# Patient Record
Sex: FEMALE | Race: WHITE | NOT HISPANIC OR LATINO | Employment: FULL TIME | URBAN - METROPOLITAN AREA
[De-identification: names, ages, dates, MRNs, and addresses within clinical notes are randomized per-mention and may not be internally consistent; named-entity substitution may affect disease eponyms.]

---

## 2021-02-09 ENCOUNTER — TELEPHONE (OUTPATIENT)
Dept: TRANSPLANT | Facility: CLINIC | Age: 41
End: 2021-02-09

## 2021-02-09 NOTE — TELEPHONE ENCOUNTER
"[employer] BREEZE  [id number]      LIVING KIDNEY DONOR EVALUATION  Donor First Name Isabel Donor MRN    Donor Last Name Hugo Completed 2021 1:22 PM    1980 Record ID [id number]   BREEZE Screen PASSED     Intended Recipient  Recipient First Name Eulalia Recipient MRN    Recipient Last Name Chacha Relationship Close Friend   Recipient  1962 Recipient Diagnosis    Recipient's ABO      Donor Information  Age 40 Gender Female   Ht 160 cm (5' 3'') Race    Wt 70.7 kg (156 lbs) Ethnicity Not /   BMI 27.60 kg/m  Preferred Language English      Required No     Blood Type A   Demographics  Home Address [facility] [phone]   [facility] Type [facility] Alternate #    [facility] [address] Type    Country [place] Preferred Contact day Mon, Fri, Thur, Wed, Tue   Email [email or web address] Preferred Contact time 11:00 AM-1:00 PM   &&   Donor's Medical Information  Medical History Dysfunctional Uterine Bleeding   Endometriosis   other (\"endometriosis\")   other (\"keep sickness at bay\")   other (\"leg cramps\") Medications Estradiol Tablets   Multivitamin   Potassium Citrate Extended Release   Vitamin B12 Tablets   Vitamin C   Vitamin D3   Zinc   Surgical History Hysterectomy   Ovarian Cystectomy   Uterine and/or Ovarian Surgery, NOS Allergies Percocet : Rash, Nausea and/or Vomiting   Social History EtOH: Rare (1-2 drinks/month)   Illicit Drug Use: Denies   Tobacco: Denies Self-Reported Functional Status \"I am able to climb 2 flights of stairs without stopping\"   Family Medical History Cancer (Father, Mother, Aunt or Uncle)   Diabetes (Sibling)   Heart Disease (denies)   Hypertension (denies)   Kidney Disease (denies)   Kidney Stones (denies) Exercise Frequency Exercise (Not on a regular basis)   Review of Organ Systems  Review of Systems Airway or Lungs: No   Blood Disorder: No   Cancer: No   Diabetes,Thyroid,Adrenal,Endocrine Disorder: No "   Digestive or Liver: No   Female Health: Yes   Heart or Circulatory System: No   Immune Diseases: No   Kidneys and Bladder: No   Muscles,Bones,Joints: No   Neuro: No   Psych: No   &&   Donor's Social Information  Marital Status Single Living Accommodation Lives in rented accommodation   Level of Education High school or secondary school degree complete Living Arrangement Alone   Employment Status Full Time Concerns: health and life insurance No   Employer [employer] Concerns: job security and lost income No   Occupation      Medical Insurance Status Has medical insurance     High Risk Behavior  High Risk Behaviors Blood transfusion < 12 months. (NO)   Commercial sex < 12 months. (NO)   Illicit IV drug use < 5yrs. (NO)   Other high risk sexual contact < 12 months. (NO)   Reason for Donation  Referral Tx Candidate Reason for Donation I would do anything for this woman. I am a listed donor on my license as well.   Permission to Disclose Inquiry Yes Patient Comments    Donor Motivation Level Highly motivated donor     PCP Contact  PCP Name Dr. Izaguirre   PCP [facility]   PCP Phone [phone]   Emergency Contact  First Name Sailaja First Name Buzz   Last Name Gaurav Last Name Chyna   Phone # [phone] Phone # [phone]   Phone Type Mobile Phone Type Mobile   Relationship Sister Relationship Daughter   Office Use  Reviewed By    Reviewed 2/9/2021 4:12 PM   Admin Folder Archive   Comments    Lost for Followup    Extended Comments    BREEZE ID [email or web address]:[id number] survey status completed   Activity History  Call  Due Date 2/9/2021   Last Modified Date/Time 2/9/2021 12:14 PM   Comments

## 2021-02-10 DIAGNOSIS — Z00.5 TRANSPLANT DONOR EVALUATION: Primary | ICD-10-CM

## 2023-05-01 ENCOUNTER — TRANSFERRED RECORDS (OUTPATIENT)
Dept: MULTI SPECIALTY CLINIC | Facility: CLINIC | Age: 43
End: 2023-05-01

## 2024-02-01 ENCOUNTER — TRANSFERRED RECORDS (OUTPATIENT)
Dept: HEALTH INFORMATION MANAGEMENT | Facility: CLINIC | Age: 44
End: 2024-02-01
Payer: COMMERCIAL

## 2024-02-13 SDOH — HEALTH STABILITY: PHYSICAL HEALTH: ON AVERAGE, HOW MANY DAYS PER WEEK DO YOU ENGAGE IN MODERATE TO STRENUOUS EXERCISE (LIKE A BRISK WALK)?: 2 DAYS

## 2024-02-13 SDOH — HEALTH STABILITY: PHYSICAL HEALTH: ON AVERAGE, HOW MANY MINUTES DO YOU ENGAGE IN EXERCISE AT THIS LEVEL?: 10 MIN

## 2024-02-13 ASSESSMENT — LIFESTYLE VARIABLES
HOW OFTEN DO YOU HAVE A DRINK CONTAINING ALCOHOL: 2-4 TIMES A MONTH
HOW OFTEN DO YOU HAVE SIX OR MORE DRINKS ON ONE OCCASION: NEVER
HOW MANY STANDARD DRINKS CONTAINING ALCOHOL DO YOU HAVE ON A TYPICAL DAY: 1 OR 2
AUDIT-C TOTAL SCORE: 2
SKIP TO QUESTIONS 9-10: 1

## 2024-02-13 ASSESSMENT — SOCIAL DETERMINANTS OF HEALTH (SDOH)
HOW OFTEN DO YOU GET TOGETHER WITH FRIENDS OR RELATIVES?: ONCE A WEEK
ARE YOU MARRIED, WIDOWED, DIVORCED, SEPARATED, NEVER MARRIED, OR LIVING WITH A PARTNER?: LIVING WITH PARTNER
HOW OFTEN DO YOU ATTENT MEETINGS OF THE CLUB OR ORGANIZATION YOU BELONG TO?: NEVER
HOW OFTEN DO YOU ATTEND CHURCH OR RELIGIOUS SERVICES?: MORE THAN 4 TIMES PER YEAR
DO YOU BELONG TO ANY CLUBS OR ORGANIZATIONS SUCH AS CHURCH GROUPS UNIONS, FRATERNAL OR ATHLETIC GROUPS, OR SCHOOL GROUPS?: NO
HOW OFTEN DO YOU GET TOGETHER WITH FRIENDS OR RELATIVES?: ONCE A WEEK
IN A TYPICAL WEEK, HOW MANY TIMES DO YOU TALK ON THE PHONE WITH FAMILY, FRIENDS, OR NEIGHBORS?: MORE THAN THREE TIMES A WEEK

## 2024-02-14 ENCOUNTER — OFFICE VISIT (OUTPATIENT)
Dept: FAMILY MEDICINE | Facility: CLINIC | Age: 44
End: 2024-02-14
Payer: COMMERCIAL

## 2024-02-14 VITALS
HEIGHT: 63 IN | SYSTOLIC BLOOD PRESSURE: 123 MMHG | TEMPERATURE: 97.5 F | WEIGHT: 171.2 LBS | RESPIRATION RATE: 16 BRPM | BODY MASS INDEX: 30.33 KG/M2 | OXYGEN SATURATION: 95 % | DIASTOLIC BLOOD PRESSURE: 81 MMHG | HEART RATE: 79 BPM

## 2024-02-14 DIAGNOSIS — Z11.4 SCREENING FOR HIV (HUMAN IMMUNODEFICIENCY VIRUS): Primary | ICD-10-CM

## 2024-02-14 DIAGNOSIS — Z11.59 NEED FOR HEPATITIS C SCREENING TEST: ICD-10-CM

## 2024-02-14 DIAGNOSIS — Z80.1 FAMILY HX OF LUNG CANCER: ICD-10-CM

## 2024-02-14 DIAGNOSIS — Z12.4 CERVICAL CANCER SCREENING: ICD-10-CM

## 2024-02-14 DIAGNOSIS — R04.0 EPISTAXIS: ICD-10-CM

## 2024-02-14 DIAGNOSIS — K76.0 FATTY LIVER: ICD-10-CM

## 2024-02-14 DIAGNOSIS — Z90.710 S/P ABDOMINAL HYSTERECTOMY AND RIGHT SALPINGO-OOPHORECTOMY: ICD-10-CM

## 2024-02-14 DIAGNOSIS — Z12.31 ENCOUNTER FOR SCREENING MAMMOGRAM FOR BREAST CANCER: ICD-10-CM

## 2024-02-14 DIAGNOSIS — K58.9 IRRITABLE BOWEL SYNDROME, UNSPECIFIED TYPE: ICD-10-CM

## 2024-02-14 DIAGNOSIS — Z90.721 S/P ABDOMINAL HYSTERECTOMY AND RIGHT SALPINGO-OOPHORECTOMY: ICD-10-CM

## 2024-02-14 DIAGNOSIS — Z00.00 ROUTINE GENERAL MEDICAL EXAMINATION AT A HEALTH CARE FACILITY: ICD-10-CM

## 2024-02-14 DIAGNOSIS — Z90.79 S/P ABDOMINAL HYSTERECTOMY AND RIGHT SALPINGO-OOPHORECTOMY: ICD-10-CM

## 2024-02-14 DIAGNOSIS — Z00.00 VISIT FOR PREVENTIVE HEALTH EXAMINATION: ICD-10-CM

## 2024-02-14 PROBLEM — I77.4 CELIAC ARTERY COMPRESSION SYNDROME (H): Status: ACTIVE | Noted: 2024-02-14

## 2024-02-14 PROCEDURE — G0145 SCR C/V CYTO,THINLAYER,RESCR: HCPCS | Performed by: PHYSICIAN ASSISTANT

## 2024-02-14 PROCEDURE — 99386 PREV VISIT NEW AGE 40-64: CPT | Performed by: PHYSICIAN ASSISTANT

## 2024-02-14 PROCEDURE — 87624 HPV HI-RISK TYP POOLED RSLT: CPT | Performed by: PHYSICIAN ASSISTANT

## 2024-02-14 PROCEDURE — 99214 OFFICE O/P EST MOD 30 MIN: CPT | Mod: 25 | Performed by: PHYSICIAN ASSISTANT

## 2024-02-14 RX ORDER — ESTRADIOL 1 MG/1
1 TABLET ORAL DAILY
COMMUNITY
End: 2024-02-14

## 2024-02-14 RX ORDER — LEVOCETIRIZINE DIHYDROCHLORIDE 5 MG/1
5 TABLET, FILM COATED ORAL EVERY EVENING
COMMUNITY

## 2024-02-14 RX ORDER — ESTRADIOL 1 MG/1
1 TABLET ORAL DAILY
Qty: 90 TABLET | Refills: 3 | Status: SHIPPED | OUTPATIENT
Start: 2024-02-14 | End: 2024-02-28

## 2024-02-14 ASSESSMENT — PAIN SCALES - GENERAL: PAINLEVEL: NO PAIN (0)

## 2024-02-14 NOTE — PATIENT INSTRUCTIONS
[facility] in [place], [place]  -Dr. Mehrdad Lopez  - schedule through text: [phone]     Preventive Care Advice   This is general advice given by our system to help you stay healthy. However, your care team may have specific advice just for you. Please talk to your care team about your preventive care needs.  Nutrition  Eat 5 or more servings of fruits and vegetables each day.  Try wheat bread, brown rice and whole grain pasta (instead of white bread, rice, and pasta).  Get enough calcium and vitamin D. Check the label on foods and aim for 100% of the RDA (recommended daily allowance).  Lifestyle  Exercise at least 150 minutes each week  (30 minutes a day, 5 days a week).  Do muscle strengthening activities 2 days a week. These help control your weight and prevent disease.  No smoking.  Wear sunscreen to prevent skin cancer.  Have a dental exam and cleaning every 6 months.  Yearly exams  See your health care team every year to talk about:  Any changes in your health.  Any medicines your care team has prescribed.  Preventive care, family planning, and ways to prevent chronic diseases.  Shots (vaccines)   HPV shots (up to age 26), if you've never had them before.  Hepatitis B shots (up to age 59), if you've never had them before.  COVID-19 shot: Get this shot when it's due.  Flu shot: Get a flu shot every year.  Tetanus shot: Get a tetanus shot every 10 years.  Pneumococcal, hepatitis A, and RSV shots: Ask your care team if you need these based on your risk.  Shingles shot (for age 50 and up)  General health tests  Diabetes screening:  Starting at age 35, Get screened for diabetes at least every 3 years.  If you are younger than age 35, ask your care team if you should be screened for diabetes.  Cholesterol test: At age 39, start having a cholesterol test every 5 years, or more often if advised.  Bone density scan (DEXA): At age 50, ask your care team if you should have this scan for osteoporosis  (brittle bones).  Hepatitis C: Get tested at least once in your life.  STIs (sexually transmitted infections)  Before age 24: Ask your care team if you should be screened for STIs.  After age 24: Get screened for STIs if you're at risk. You are at risk for STIs (including HIV) if:  You are sexually active with more than one person.  You don't use condoms every time.  You or a partner was diagnosed with a sexually transmitted infection.  If you are at risk for HIV, ask about PrEP medicine to prevent HIV.  Get tested for HIV at least once in your life, whether you are at risk for HIV or not.  Cancer screening tests  Cervical cancer screening: If you have a cervix, begin getting regular cervical cancer screening tests starting at age 21.  Breast cancer scan (mammogram): If you've ever had breasts, begin having regular mammograms starting at age 40. This is a scan to check for breast cancer.  Colon cancer screening: It is important to start screening for colon cancer at age 45.  Have a colonoscopy test every 10 years (or more often if you're at risk) Or, ask your provider about stool tests like a FIT test every year or Cologuard test every 3 years.  To learn more about your testing options, visit:   https://www.[employer]/033000.pdf.  For help making a decision, visit:   https://bit.ly/rz30662.  Prostate cancer screening test: If you have a prostate, ask your care team if a prostate cancer screening test (PSA) at age 55 is right for you.  Lung cancer screening: If you are a current or former smoker ages 50 to 80, ask your care team if ongoing lung cancer screenings are right for you.  For informational purposes only. Not to replace the advice of your health care provider. Copyright   2023 [place] [employer]. All rights reserved. Clinically reviewed by the [facility] Transitions Program. [employer] 239627 - REV 01/24.    Learning About Stress  What is stress?     Stress is your body's response to a hard  situation. Your body can have a physical, emotional, or mental response. Stress is a fact of life for most people, and it affects everyone differently. What causes stress for you may not be stressful for someone else.  A lot of things can cause stress. You may feel stress when you go on a job interview, take a test, or run a race. This kind of short-term stress is normal and even useful. It can help you if you need to work hard or react quickly. For example, stress can help you finish an important job on time.  Long-term stress is caused by ongoing stressful situations or events. Examples of long-term stress include long-term health problems, ongoing problems at work, or conflicts in your family. Long-term stress can harm your health.  How does stress affect your health?  When you are stressed, your body responds as though you are in danger. It makes hormones that speed up your heart, make you breathe faster, and give you a burst of energy. This is called the fight-or-flight stress response. If the stress is over quickly, your body goes back to normal and no harm is done.  But if stress happens too often or lasts too long, it can have bad effects. Long-term stress can make you more likely to get sick, and it can make symptoms of some diseases worse. If you tense up when you are stressed, you may develop neck, shoulder, or low back pain. Stress is linked to high blood pressure and heart disease.  Stress also harms your emotional health. It can make you hammond, tense, or depressed. Your relationships may suffer, and you may not do well at work or school.  What can you do to manage stress?  You can try these things to help manage stress:   Do something active. Exercise or activity can help reduce stress. Walking is a great way to get started. Even everyday activities such as housecleaning or yard work can help.  Try yoga or ashlee chi. These techniques combine exercise and meditation. You may need some training at first to  learn them.  Do something you enjoy. For example, listen to music or go to a movie. Practice your hobby or do volunteer work.  Meditate. This can help you relax, because you are not worrying about what happened before or what may happen in the future.  Do guided imagery. Imagine yourself in any setting that helps you feel calm. You can use online videos, books, or a teacher to guide you.  Do breathing exercises. For example:  From a standing position, bend forward from the waist with your knees slightly bent. Let your arms dangle close to the floor.  Breathe in slowly and deeply as you return to a standing position. Roll up slowly and lift your head last.  Hold your breath for just a few seconds in the standing position.  Breathe out slowly and bend forward from the waist.  Let your feelings out. Talk, laugh, cry, and express anger when you need to. Talking with supportive friends or family, a counselor, or a ade leader about your feelings is a healthy way to relieve stress. Avoid discussing your feelings with people who make you feel worse.  Write. It may help to write about things that are bothering you. This helps you find out how much stress you feel and what is causing it. When you know this, you can find better ways to cope.  What can you do to prevent stress?  You might try some of these things to help prevent stress:  Manage your time. This helps you find time to do the things you want and need to do.  Get enough sleep. Your body recovers from the stresses of the day while you are sleeping.  Get support. Your family, friends, and community can make a difference in how you experience stress.  Limit your news feed. Avoid or limit time on social media or news that may make you feel stressed.  Do something active. Exercise or activity can help reduce stress. Walking is a great way to get started.  Where can you learn more?  Go to https://www.healthwise.net/patiented  Enter N032 in the search box to learn more  "about \"Learning About Stress.\"  Current as of: February 26, 2023               Content Version: 13.8    3533-5821 [employer].   Care instructions adapted under license by your healthcare professional. If you have questions about a medical condition or this instruction, always ask your healthcare professional. [employer] disclaims any warranty or liability for your use of this information.      "

## 2024-02-14 NOTE — PROGRESS NOTES
Preventive Care Visit  [facility]  Andry Chamberlain PA-C, Family Medicine  Feb 14, 2024    Assessment & Plan     Screening for HIV (human immunodeficiency virus)  Low risk. Denied.     Need for hepatitis C screening test    - Hepatitis C Screen Reflex to HCV RNA Quant and Genotype; Future    Cervical cancer screening    - Pap Screen with HPV - recommended age 30 - 65 years    Fatty liver  Self reported. Monitoring lfts and cbc. Fib-4 to be calculated.   - CBC with platelets and differential; Future  - Comprehensive metabolic panel (BMP + Alb, Alk Phos, ALT, AST, Total. Bili, TP); Future    S/P abdominal hysterectomy and right salpingo-oophorectomy  On chronic estrogen replacement. No risk factors known outside of estrace use for clots. Given age under 60, complications of this is  rare.  I do recommend annual mammograms however given increased breast cancer risk. May continue this until expected into menopause. This is to be  expected ~55 years. Will taper dose down in future.   - estradiol (ESTRACE) 1 MG tablet; Take 1 tablet (1 mg) by mouth daily  Medication use and side effects discussed with the patient. Patient is in complete understanding and agreement with plan.     Encounter for screening mammogram for breast cancer  As above   - MA Screen Bilateral w/Andrea; Future    Visit for preventive health examination  Other than above and below, stable wellness. Not fasting. Fasting labs  pending and  also will  - Glucose; Future  - Lipid panel reflex to direct LDL Fasting; Future    Routine general medical examination at a health care facility  As above     Epistaxis  Reported. Single nare. No other symptoms  or exam findings to suggest bleeding disorder. Likely hypervascular etiology. Will have see primary care ent clinic and discuss possible cautery. Contact information given to patient.     Family hx of lung cancer  Strong family history in quite young ages. Discussed possible genetic  "predisposition. Patient would be interested in considering genetic testing. Referral given.   - Adult Genetics & Metabolism Referral; Future      Also question diagnosis  of possible CAD given age and lack of knowledge on this diagnosis from patient. Will await COLBY and records to review. If truly present, would recommend  ASA and statin.       BMI  Estimated body mass index is 30.33 kg/m  as calculated from the following:    Height as of this encounter: 1.6 m (5' 3\").    Weight as of this encounter: 77.7 kg (171 lb 3.2 oz).   Weight management plan: Discussed healthy diet and exercise guidelines    Counseling  Appropriate preventive services were discussed with this patient, including applicable screening as appropriate for fall prevention, nutrition, physical activity, Tobacco-use cessation, weight loss and cognition.  Checklist reviewing preventive services available has been given to the patient.  Reviewed patient's diet, addressing concerns and/or questions.   She is at risk for lack of exercise and has been provided with information to increase physical activity for the benefit of her well-being.   She is at risk for psychosocial distress and has been provided with information to reduce risk.             Eduardo Hall is a 43 year old, presenting for the following:  Physical        2/14/2024     3:05 PM   Additional Questions   Roomed by Kimber GERMAN CMA        Via the Health Maintenance questionnaire, the patient has reported the following services have been completed -Mammogram-Colonscopy, this information has been sent to the abstraction team.  Health Care Directive  Patient does not have a Health Care Directive or Living Will: Discussed advance care planning with patient; information given to patient to review.    HPI    Has been having bloody nose for about a year. Wakes up every morning with a blocked right nostril (with scabbed blood).   Right nostril bleeding  Isabel has been having bloody nose at " No the right side for about a year. Wakes up every morning with a blocked right nostril with scabbed blood. She said taking hot shower helps with cleaning up the congestion. She takes Xyzal daily for allergy but that did not help with the congested nose on the right side. Denies fatigue, denies bruise or petechia; Denies trauma to her nose.      Pap screening   History of total hysterectomy supracervical with right oophorectomy d/t endometriosis,with bilateral salpingectomy, right sided oophorectomy. she has been doing pap smear every three year with her previous provider. Denies urinary symptoms, odor or abnormal discharge. Will do a pap smear today. Has taken estrace 1 mg daily for years. No side effects. No personal history of smoking or known personal or family history of clotting disorders.      Family history of Lung Cancer  Isabel is a non-smoker without symptoms of shortness of breath, or weight loss. Referral for genetic testing due to early onset of lung cancer in parents and siblings.     Mammogram  Isabel reports her last mammogram was two years ago, no abnormal findings.       Past history of fatty liver per patient.     Also states was told by her previous GI doctor she has atherosclerotic coronary artery disease. She denies ever seeing a cardiologist. No chest pains or shortness of breath. No history of angiograms or past known evaluations of this.         2/13/2024   General Health   How would you rate your overall physical health? Good   Feel stress (tense, anxious, or unable to sleep) Only a little    Only a little   (!) STRESS CONCERN      2/13/2024   Nutrition   Three or more servings of calcium each day? Yes   Diet: Other   If other, please elaborate: No eggs or chicken.  Allergy   How many servings of fruit and vegetables per day? (!) 2-3   How many sweetened beverages each day? 0-1         2/13/2024   Exercise   Days per week of moderate/strenous exercise 2 days    2 days   Average minutes spent  exercising at this level 10 min    10 min   (!) EXERCISE CONCERN      2/13/2024   Social Factors   Frequency of gathering with friends or relatives Once a week    Once a week   Worry food won't last until get money to buy more No    No   Food not last or not have enough money for food? No    No   Do you have housing?  Yes    Yes   Are you worried about losing your housing? No    No   Lack of transportation? No    No   Unable to get utilities (heat,electricity)? No    No         2/13/2024   Dental   Dentist two times every year? Yes         2/13/2024   TB Screening   Were you born outside of [place]?  No     Today's PHQ-2 Score:       2/13/2024     3:53 PM   PHQ-2 ( 1999 [employer])   Q1: Little interest or pleasure in doing things 0   Q2: Feeling down, depressed or hopeless 0   PHQ-2 Score 0   Q1: Little interest or pleasure in doing things Not at all   Q2: Feeling down, depressed or hopeless Not at all   PHQ-2 Score 0         2/13/2024   Substance Use   Frequency of drinking alcohol? 2-4 times a month   Alcohol more than 3/day or more than 7/wk No   Do you use any other substances recreationally? No     Social History     Tobacco Use    Smoking status: Never    Smokeless tobacco: Never   Vaping Use    Vaping Use: Never used   Substance Use Topics    Alcohol use: Yes     Comment: Occasionally    Drug use: Never         2/13/2024   Breast Cancer Screening   Family history of breast, colon, or ovarian cancer? No / Unknown      Mammogram Screening - Annual screen due to greater than 20% lifetime risk as estimated by Breast Cancer Risk Calculator        2/13/2024   One time HIV Screening   Previous HIV test? No         2/13/2024   STI Screening   New sexual partner(s) since last STI/HIV test? No     History of abnormal Pap smear: NO - age 30-65 PAP every 5 years with negative HPV co-testing recommended       The ASCVD Risk score (Wilton GONZALEZ, et al., 2019) failed to calculate for the following reasons:    Cannot find a previous  "HDL lab    Cannot find a previous total cholesterol lab       Reviewed and updated as needed this visit by Provider                  No past medical history on file.  Past Surgical History:   Procedure Laterality Date    COLONOSCOPY  9/2021    HYSTERECTOMY, SUPRACERVICAL, ABDOMINAL, WITH SALPINGO-OOPHORECTOMY, CYSTOSCOPY  April 1999     BP Readings from Last 3 Encounters:   02/14/24 123/81    Wt Readings from Last 3 Encounters:   02/14/24 77.7 kg (171 lb 3.2 oz)                  Patient Active Problem List   Diagnosis    Celiac artery compression syndrome (H24)     Past Surgical History:   Procedure Laterality Date    COLONOSCOPY  9/2021    HYSTERECTOMY, SUPRACERVICAL, ABDOMINAL, WITH SALPINGO-OOPHORECTOMY, CYSTOSCOPY  April 1999       Social History     Tobacco Use    Smoking status: Never    Smokeless tobacco: Never   Substance Use Topics    Alcohol use: Yes     Comment: Occasionally     Family History   Problem Relation Age of Onset    Lung Cancer Mother         Non small cell carcinoma    Lung Cancer Father         Agent Orange    Lung Cancer Sister         Lung- adenocacinoma    Lung Cancer Brother         Non small cell carcinoma         Current Outpatient Medications   Medication Sig Dispense Refill    estradiol (ESTRACE) 1 MG tablet Take 1 tablet (1 mg) by mouth daily 90 tablet 3    levocetirizine (XYZAL ALLERGY 24HR) 5 MG tablet Take 5 mg by mouth every evening      Saccharomyces boulardii 250 MG PACK        Allergies   Allergen Reactions    Oxycodone Hives, Itching and Nausea     No lab results found.          Objective    Exam  /81 (BP Location: Right arm, Patient Position: Sitting, Cuff Size: Adult Regular)   Pulse 79   Temp 97.5  F (36.4  C) (Temporal)   Resp 16   Ht 1.6 m (5' 3\")   Wt 77.7 kg (171 lb 3.2 oz)   LMP  (LMP Unknown)   SpO2 95%   BMI 30.33 kg/m     Estimated body mass index is 30.33 kg/m  as calculated from the following:    Height as of this encounter: 1.6 m (5' 3\").    " Weight as of this encounter: 77.7 kg (171 lb 3.2 oz).    Physical Exam  GENERAL: alert and no distress  EYES: Eyes grossly normal to inspection, PERRL and conjunctivae and sclerae normal  HENT: ear canals and TM's normal, nose and mouth without ulcers or lesions  NECK: no adenopathy, no asymmetry, masses, or scars  RESP: lungs clear to auscultation - no rales, rhonchi or wheezes  BREAST: normal without masses, tenderness or nipple discharge and no palpable axillary masses or adenopathy  CV: regular rate and rhythm, normal S1 S2, no S3 or S4, no murmur, click or rub, no peripheral edema  ABDOMEN: soft, nontender, no hepatosplenomegaly, no masses and bowel sounds normal   (female) w/bimanual: normal female external genitalia, normal urethral meatus, normal vaginal mucosa, and normal cervix. Normal s/p hysterectomy exam noted.   MS: no gross musculoskeletal defects noted, no edema  SKIN: no suspicious lesions or rashes  NEURO: Normal strength and tone, mentation intact and speech normal  PSYCH: mentation appears normal, affect normal/bright    Signed Electronically by: Andry Chamberlain PA-C

## 2024-02-14 NOTE — PROGRESS NOTES
Preventive Care Visit  [facility]  Andry Chamberlain PA-C, Family Medicine  Feb 14, 2024  {Provider  Link to SmartSet :741403}  {PROVIDER CHARTING PREFERENCE:061157}    Subjective   Isabel is a 43 year old, presenting for the following:  Physical        2/14/2024     3:05 PM   Additional Questions   Roomed by Kimber GERMAN CMA        Via the [employer] Maintenance questionnaire, the patient has reported the following services have been completed -Mammogram-Colonscopy, this information has been sent to the abstraction team.  Health Care Directive  Patient does not have a Health Care Directive or Living Will: {ADVANCE_DIRECTIVE_STATUS:056156}    HPI    Right nostril bleeding  Isabel has been having bloody nose at the right side for about a year. Wakes up every morning with a blocked right nostril with scabbed blood. She said taking hot shower helps with cleaning up the congestion. She takes Xyzal daily for allergy but that did not help with the congested nose on the right side. Denies fatigue, denies bruise or petechia; Denies trauma to her nose.     Pap screening   History of total hysterectomy d/t endometriosis,with bilateral salpingectomy, right sided oophorectomy. she has been doing pap smear every three year with her previous provider. Denies urinary symptoms, odor or abnormal discharge. Will do a pap smear today     Family history of Lung Cancer  Isabel is a non-smoker without symptoms of shortness of breath, or weight loss. Referral for genetic testing due to early onset of lung cancer in parents and siblings.    Mammogram  Isabel reports her last mammogram was two years ago, no abnormal findings.   - Breast exam was significant for a fibrositic change on left breast on outer upper quadrant. It is painless and mobile, size about 0.5 cm in diameter.   - Patient is encouraged to do a breast mammogram     Future labs for annual screening   - CBC   - CMP  - Fasting Lipid     {SUPERLIST  (Optional):333538}  {additonal problems for provider to add (Optional):261215}      2/13/2024   General Health   How would you rate your overall physical health? Good   Feel stress (tense, anxious, or unable to sleep) Only a little    Only a little   (!) STRESS CONCERN      2/13/2024   Nutrition   Three or more servings of calcium each day? Yes   Diet: Other   If other, please elaborate: No eggs or chicken.  Allergy   How many servings of fruit and vegetables per day? (!) 2-3   How many sweetened beverages each day? 0-1         2/13/2024   Exercise   Days per week of moderate/strenous exercise 2 days    2 days   Average minutes spent exercising at this level 10 min    10 min   (!) EXERCISE CONCERN      2/13/2024   Social Factors   Frequency of gathering with friends or relatives Once a week    Once a week   Worry food won't last until get money to buy more No    No   Food not last or not have enough money for food? No    No   Do you have housing?  Yes    Yes   Are you worried about losing your housing? No    No   Lack of transportation? No    No   Unable to get utilities (heat,electricity)? No    No         2/13/2024   Dental   Dentist two times every year? Yes         2/13/2024   TB Screening   Were you born outside of [place]?  No     Today's PHQ-2 Score:       2/13/2024     3:53 PM   PHQ-2 ( 1999 [employer])   Q1: Little interest or pleasure in doing things 0   Q2: Feeling down, depressed or hopeless 0   PHQ-2 Score 0   Q1: Little interest or pleasure in doing things Not at all   Q2: Feeling down, depressed or hopeless Not at all   PHQ-2 Score 0         2/13/2024   Substance Use   Frequency of drinking alcohol? 2-4 times a month   Alcohol more than 3/day or more than 7/wk No   Do you use any other substances recreationally? No     Social History     Tobacco Use    Smoking status: Never    Smokeless tobacco: Never   Vaping Use    Vaping Use: Never used   Substance Use Topics    Alcohol use: Yes     Comment: Occasionally     "Drug use: Never         2/13/2024   Breast Cancer Screening   Family history of breast, colon, or ovarian cancer? No / Unknown      {Mammogram Decision Support (Optional):079877}        2/13/2024   One time HIV Screening   Previous HIV test? No         2/13/2024   STI Screening   New sexual partner(s) since last STI/HIV test? No     History of abnormal Pap smear: NO - age 30-65 PAP every 5 years with negative HPV co-testing recommended       The ASCVD Risk score (Wilton GONZALEZ, et al., 2019) failed to calculate for the following reasons:    Cannot find a previous HDL lab    Cannot find a previous total cholesterol lab    {Use the storyboard to review patient history, after sections have been marked as reviewed, refresh note to capture documentation:778201}   Reviewed and updated as needed this visit by Provider                  {HISTORY OPTIONS (Optional):225371}    {ROS Picklists (Optional):227101}     Objective    Exam  /81 (BP Location: Right arm, Patient Position: Sitting, Cuff Size: Adult Regular)   Pulse 79   Temp 97.5  F (36.4  C) (Temporal)   Resp 16   Ht 1.6 m (5' 3\")   Wt 77.7 kg (171 lb 3.2 oz)   LMP  (LMP Unknown)   SpO2 95%   BMI 30.33 kg/m     Estimated body mass index is 30.33 kg/m  as calculated from the following:    Height as of this encounter: 1.6 m (5' 3\").    Weight as of this encounter: 77.7 kg (171 lb 3.2 oz).    Physical Exam  {FEMALE - complete :074535}    Signed Electronically by: Andry Chamberlain PA-C  {Email feedback regarding this note to [email or web address]   :355281}  "

## 2024-02-15 ENCOUNTER — PATIENT OUTREACH (OUTPATIENT)
Dept: ONCOLOGY | Facility: CLINIC | Age: 44
End: 2024-02-15
Payer: COMMERCIAL

## 2024-02-15 NOTE — PROGRESS NOTES
Writer received Cancer Risk Management Program referral, referred for:    Family hx of lung cancer      Reviewed for appropriate plan, and sent to New Patient Scheduling for completion.

## 2024-02-19 LAB
BKR LAB AP GYN ADEQUACY: NORMAL
BKR LAB AP GYN INTERPRETATION: NORMAL
BKR LAB AP HPV REFLEX: NORMAL
BKR LAB AP PREVIOUS ABNORMAL: NORMAL
PATH REPORT.COMMENTS IMP SPEC: NORMAL
PATH REPORT.COMMENTS IMP SPEC: NORMAL
PATH REPORT.RELEVANT HX SPEC: NORMAL

## 2024-02-21 ENCOUNTER — HOSPITAL ENCOUNTER (OUTPATIENT)
Dept: NUCLEAR MEDICINE | Facility: CLINIC | Age: 44
Discharge: HOME | End: 2024-02-21
Attending: PHYSICIAN ASSISTANT
Payer: COMMERCIAL

## 2024-02-21 DIAGNOSIS — Z86.19 HX OF CLOSTRIDIUM DIFFICILE INFECTION: ICD-10-CM

## 2024-02-21 DIAGNOSIS — R10.13 EPIGASTRIC PAIN: ICD-10-CM

## 2024-02-21 DIAGNOSIS — K90.41 GLUTEN ENTEROPATHY: ICD-10-CM

## 2024-02-21 DIAGNOSIS — R68.81 EARLY SATIETY: ICD-10-CM

## 2024-02-21 DIAGNOSIS — R19.8 IRREGULAR BOWEL HABITS: ICD-10-CM

## 2024-02-21 PROCEDURE — A9541 TC99M SULFUR COLLOID: HCPCS | Performed by: PHYSICIAN ASSISTANT

## 2024-02-21 PROCEDURE — 343N000001 HC RX 343: Performed by: PHYSICIAN ASSISTANT

## 2024-02-21 PROCEDURE — 78264 GASTRIC EMPTYING IMG STUDY: CPT

## 2024-02-21 RX ADMIN — Medication 1 MILLICURIE: at 12:20

## 2024-02-22 ENCOUNTER — LAB (OUTPATIENT)
Dept: LAB | Facility: CLINIC | Age: 44
End: 2024-02-22
Payer: COMMERCIAL

## 2024-02-22 DIAGNOSIS — Z11.59 NEED FOR HEPATITIS C SCREENING TEST: ICD-10-CM

## 2024-02-22 DIAGNOSIS — Z00.00 VISIT FOR PREVENTIVE HEALTH EXAMINATION: ICD-10-CM

## 2024-02-22 DIAGNOSIS — K76.0 FATTY LIVER: ICD-10-CM

## 2024-02-22 LAB
ALBUMIN SERPL BCG-MCNC: 4.5 G/DL (ref 3.5–5.2)
ALP SERPL-CCNC: 52 U/L (ref 40–150)
ALT SERPL W P-5'-P-CCNC: 78 U/L (ref 0–50)
ANION GAP SERPL CALCULATED.3IONS-SCNC: 10 MMOL/L (ref 7–15)
AST SERPL W P-5'-P-CCNC: 45 U/L (ref 0–45)
BASOPHILS # BLD AUTO: 0 10E3/UL (ref 0–0.2)
BASOPHILS NFR BLD AUTO: 1 %
BILIRUB SERPL-MCNC: 0.3 MG/DL
BUN SERPL-MCNC: 20.3 MG/DL (ref 6–20)
CALCIUM SERPL-MCNC: 9.4 MG/DL (ref 8.6–10)
CHLORIDE SERPL-SCNC: 103 MMOL/L (ref 98–107)
CHOLEST SERPL-MCNC: 215 MG/DL
CREAT SERPL-MCNC: 0.74 MG/DL (ref 0.51–0.95)
DEPRECATED HCO3 PLAS-SCNC: 26 MMOL/L (ref 22–29)
EGFRCR SERPLBLD CKD-EPI 2021: >90 ML/MIN/1.73M2
EOSINOPHIL # BLD AUTO: 0 10E3/UL (ref 0–0.7)
EOSINOPHIL NFR BLD AUTO: 0 %
ERYTHROCYTE [DISTWIDTH] IN BLOOD BY AUTOMATED COUNT: 11.2 % (ref 10–15)
FASTING STATUS PATIENT QL REPORTED: YES
FASTING STATUS PATIENT QL REPORTED: YES
GLUCOSE SERPL-MCNC: 110 MG/DL (ref 70–99)
GLUCOSE SERPL-MCNC: 110 MG/DL (ref 70–99)
HCT VFR BLD AUTO: 39.9 % (ref 35–47)
HCV AB SERPL QL IA: NONREACTIVE
HDLC SERPL-MCNC: 39 MG/DL
HGB BLD-MCNC: 13.5 G/DL (ref 11.7–15.7)
HUMAN PAPILLOMA VIRUS 16 DNA: NEGATIVE
HUMAN PAPILLOMA VIRUS 18 DNA: NEGATIVE
HUMAN PAPILLOMA VIRUS FINAL DIAGNOSIS: NORMAL
HUMAN PAPILLOMA VIRUS OTHER HR: NEGATIVE
IMM GRANULOCYTES # BLD: 0 10E3/UL
IMM GRANULOCYTES NFR BLD: 0 %
LDLC SERPL CALC-MCNC: 145 MG/DL
LYMPHOCYTES # BLD AUTO: 1.7 10E3/UL (ref 0.8–5.3)
LYMPHOCYTES NFR BLD AUTO: 20 %
MCH RBC QN AUTO: 30.3 PG (ref 26.5–33)
MCHC RBC AUTO-ENTMCNC: 33.8 G/DL (ref 31.5–36.5)
MCV RBC AUTO: 90 FL (ref 78–100)
MONOCYTES # BLD AUTO: 0.4 10E3/UL (ref 0–1.3)
MONOCYTES NFR BLD AUTO: 5 %
NEUTROPHILS # BLD AUTO: 6.3 10E3/UL (ref 1.6–8.3)
NEUTROPHILS NFR BLD AUTO: 75 %
NONHDLC SERPL-MCNC: 176 MG/DL
PLATELET # BLD AUTO: 302 10E3/UL (ref 150–450)
POTASSIUM SERPL-SCNC: 4.2 MMOL/L (ref 3.4–5.3)
PROT SERPL-MCNC: 7.6 G/DL (ref 6.4–8.3)
RBC # BLD AUTO: 4.45 10E6/UL (ref 3.8–5.2)
SODIUM SERPL-SCNC: 139 MMOL/L (ref 135–145)
TRIGL SERPL-MCNC: 157 MG/DL
WBC # BLD AUTO: 8.5 10E3/UL (ref 4–11)

## 2024-02-22 PROCEDURE — 86803 HEPATITIS C AB TEST: CPT

## 2024-02-22 PROCEDURE — 80061 LIPID PANEL: CPT

## 2024-02-22 PROCEDURE — 80053 COMPREHEN METABOLIC PANEL: CPT

## 2024-02-22 PROCEDURE — 36415 COLL VENOUS BLD VENIPUNCTURE: CPT

## 2024-02-22 PROCEDURE — 85025 COMPLETE CBC W/AUTO DIFF WBC: CPT

## 2024-02-28 ENCOUNTER — TELEPHONE (OUTPATIENT)
Dept: FAMILY MEDICINE | Facility: CLINIC | Age: 44
End: 2024-02-28
Payer: COMMERCIAL

## 2024-02-28 DIAGNOSIS — Z90.721 S/P ABDOMINAL HYSTERECTOMY AND RIGHT SALPINGO-OOPHORECTOMY: ICD-10-CM

## 2024-02-28 DIAGNOSIS — Z90.710 S/P ABDOMINAL HYSTERECTOMY AND RIGHT SALPINGO-OOPHORECTOMY: ICD-10-CM

## 2024-02-28 DIAGNOSIS — Z90.79 S/P ABDOMINAL HYSTERECTOMY AND RIGHT SALPINGO-OOPHORECTOMY: ICD-10-CM

## 2024-02-28 RX ORDER — ESTRADIOL 1 MG/1
1 TABLET ORAL DAILY
Qty: 90 TABLET | Refills: 3 | Status: SHIPPED | OUTPATIENT
Start: 2024-02-28

## 2024-02-28 NOTE — TELEPHONE ENCOUNTER
Patient requesting pharmacy change- [facility] [address] [facility]     Pharmacy switched.     Erlinda HUIZAR RN on 2/28/2024 at 1:19 PM

## 2024-03-27 ENCOUNTER — TRANSFERRED RECORDS (OUTPATIENT)
Dept: HEALTH INFORMATION MANAGEMENT | Facility: CLINIC | Age: 44
End: 2024-03-27
Payer: COMMERCIAL

## 2024-04-04 ENCOUNTER — TRANSFERRED RECORDS (OUTPATIENT)
Dept: HEALTH INFORMATION MANAGEMENT | Facility: CLINIC | Age: 44
End: 2024-04-04
Payer: COMMERCIAL

## 2024-05-14 ENCOUNTER — OFFICE VISIT (OUTPATIENT)
Dept: FAMILY MEDICINE | Facility: CLINIC | Age: 44
End: 2024-05-14
Payer: COMMERCIAL

## 2024-05-14 VITALS
OXYGEN SATURATION: 97 % | SYSTOLIC BLOOD PRESSURE: 134 MMHG | DIASTOLIC BLOOD PRESSURE: 89 MMHG | TEMPERATURE: 97.4 F | WEIGHT: 173.2 LBS | HEIGHT: 63 IN | RESPIRATION RATE: 18 BRPM | HEART RATE: 63 BPM | BODY MASS INDEX: 30.69 KG/M2

## 2024-05-14 DIAGNOSIS — G43.909 MIGRAINE WITHOUT STATUS MIGRAINOSUS, NOT INTRACTABLE, UNSPECIFIED MIGRAINE TYPE: Primary | ICD-10-CM

## 2024-05-14 DIAGNOSIS — N64.4 BREAST PAIN: ICD-10-CM

## 2024-05-14 PROCEDURE — 96372 THER/PROPH/DIAG INJ SC/IM: CPT | Performed by: PHYSICIAN ASSISTANT

## 2024-05-14 PROCEDURE — 99214 OFFICE O/P EST MOD 30 MIN: CPT | Mod: 25 | Performed by: PHYSICIAN ASSISTANT

## 2024-05-14 RX ORDER — RIZATRIPTAN BENZOATE 10 MG/1
10 TABLET, ORALLY DISINTEGRATING ORAL
Qty: 12 TABLET | Refills: 11 | Status: SHIPPED | OUTPATIENT
Start: 2024-05-14

## 2024-05-14 RX ORDER — TOPIRAMATE 25 MG/1
TABLET, FILM COATED ORAL
Qty: 180 TABLET | Refills: 0 | Status: SHIPPED | OUTPATIENT
Start: 2024-05-14 | End: 2024-06-25

## 2024-05-14 RX ORDER — DEXAMETHASONE SODIUM PHOSPHATE 10 MG/ML
10 INJECTION INTRAMUSCULAR; INTRAVENOUS ONCE
Status: COMPLETED | OUTPATIENT
Start: 2024-05-14 | End: 2024-05-14

## 2024-05-14 RX ORDER — MULTIVITAMIN WITH IRON
1 TABLET ORAL 2 TIMES DAILY
COMMUNITY

## 2024-05-14 RX ORDER — KETOROLAC TROMETHAMINE 30 MG/ML
30 INJECTION, SOLUTION INTRAMUSCULAR; INTRAVENOUS ONCE
Status: COMPLETED | OUTPATIENT
Start: 2024-05-14 | End: 2024-05-14

## 2024-05-14 RX ORDER — ONDANSETRON 4 MG/1
4 TABLET, ORALLY DISINTEGRATING ORAL ONCE
Status: COMPLETED | OUTPATIENT
Start: 2024-05-14 | End: 2024-05-14

## 2024-05-14 RX ORDER — ONDANSETRON 2 MG/ML
4 INJECTION INTRAMUSCULAR; INTRAVENOUS ONCE
Status: DISCONTINUED | OUTPATIENT
Start: 2024-05-14 | End: 2024-05-14

## 2024-05-14 RX ADMIN — KETOROLAC TROMETHAMINE 30 MG: 30 INJECTION, SOLUTION INTRAMUSCULAR; INTRAVENOUS at 14:18

## 2024-05-14 RX ADMIN — ONDANSETRON 4 MG: 4 TABLET, ORALLY DISINTEGRATING ORAL at 14:19

## 2024-05-14 RX ADMIN — DEXAMETHASONE SODIUM PHOSPHATE 10 MG: 10 INJECTION INTRAMUSCULAR; INTRAVENOUS at 14:18

## 2024-05-14 ASSESSMENT — ENCOUNTER SYMPTOMS: HEADACHES: 1

## 2024-05-14 NOTE — PROGRESS NOTES
Assessment & Plan     Migraine without status migrainosus, not intractable, unspecified migraine type  Reported past history.  Added to problem list.  Has been using Maxalt via old prescription since her moved to the [facility] and tolerates this well despite Imitrex side effects.  However, not aiding in current symptoms.  Given ongoing symptoms for 7 days without improvement will treat with abortive treatments of Toradol IM, 10 mg of dexamethasone to prevent recurrence, and Zofran.  Maxalt refilled in case necessary and for ongoing prevention given ongoing and worsening symptoms discussed benefits and risks of preventative medications.  Patient would like to start 1 of these after this discussion and given mixed IBS like symptoms as well as borderline bradycardic and concerns for weight gain, recommending Topamax.  Start at 25 mg nightly and increase to twice daily after 1 week.  Follow-up in approximately 1 to 2 months.  Further dose titration can be considered in the future.  - dexAMETHasone (DECADRON) injection 10 mg  - ketorolac (TORADOL) injection 30 mg  - topiramate (TOPAMAX) 25 MG tablet; Take 25 mg at bedtime for 1 week. Then increase to 25 mg bid.  - rizatriptan (MAXALT-MLT) 10 MG ODT; Take 1 tablet (10 mg) by mouth at onset of headache for migraine May repeat in 2 hours. Max 3 tablets/24 hours.  - ondansetron (ZOFRAN ODT) ODT tab 4 mg  Medication use and side effects discussed with the patient. Patient is in complete understanding and agreement with plan.     Breast pain  Unclear cause of symptoms.  Unable to gauge menstrual cycle as possible etiology but given 14 consecutive days felt less likely to be hormonal.  Consider mastitis given description as well as possible lymphadenopathy but no skin changes and no fever and chills as well as bilateral presentation makes this less likely.  Abscess to be considered as well, did not note on exam.  Malignancy as well to be considered.  Will obtain diagnostic  "mammogram and ultrasound for evaluation and if normal workup can continue to monitor for now and if not resolving in 2 weeks consider possible thoracic radicular etiology and MRI can be considered of the thoracic spine.  - MA Diagnostic Digital Bilateral; Future  - US Breast Bilateral Complete 4 Quadrants; Future      Subjective   Isabel is a 43 year old, presenting for the following health issues:  Headache (Migraine are becoming more frequent. Patient has had migraines 7 out of the 14 days. ) and Breast Problem (Patient breast feel like they are on fire x 2 weeks)        5/14/2024     1:36 PM   Additional Questions   Roomed by Kimber GERMAN CMA     History of Present Illness       Headaches:   Since the patient's last clinic visit, headaches are: worsened  The patient is getting headaches:  At least once a week, and rafi had this one for 4 days so far  She is not able to do normal daily activities when she has a migraine.  The patient is taking the following rescue/relief medications:  Excedrin and Maxalt   Patient states \"I get only a small amount of relief\" from the rescue/relief medications.   The patient is taking the following medications to prevent migraines:  No medications to prevent migraines  In the past 4 weeks, the patient has gone to an Urgent Care or Emergency Room 0 times times due to headaches.    She eats 2-3 servings of fruits and vegetables daily.She consumes 1 sweetened beverage(s) daily.She exercises with enough effort to increase her heart rate 20 to 29 minutes per day.  She exercises with enough effort to increase her heart rate 5 days per week.   She is taking medications regularly.     In summary patient is a 43-year-old female with a past history of celiac artery compression syndrome, possible mixed irritable bowel syndrome with constipation and diarrhea and workup ongoing through gastroenterology, status post hysterectomy and right oophorectomy, fatty liver disease, and past history of " "self-reported migraines.    She reports today to discuss her worsening migraines.  She states she has had a migraine for the last 14 days and has not had any relief with her typical Maxalt, Excedrin, Zofran use.  She states that at 1 time they considered preventative medications but she would like to limit the number of medication she takes.  However, given her ongoing debilitating symptoms and frequency she would like to consider starting a preventative medication.    She describes her headaches as right-sided behind the ear and radiates along the sides to behind her eye.  States at times she does get auras prior to migraines but not always.  No recent vision changes.  No slurred speech, neck pain, ear pain, dizziness/syncope.  She admits to both light and sound sensitivity as well as nausea and vomiting with the headaches.      Of note, in regards to her possible IBS her gastroenterologist had sent to [facility] however she states she has never received this and has not contacted GI about this yet.  Her constipation/diarrhea symptoms have not changed since her visit with GI.        Objective    /89 (BP Location: Left arm, Patient Position: Sitting, Cuff Size: Adult Regular)   Pulse 63   Temp 97.4  F (36.3  C) (Oral)   Resp 18   Ht 1.61 m (5' 3.39\")   Wt 78.6 kg (173 lb 3.2 oz)   LMP  (LMP Unknown)   SpO2 97%   BMI 30.31 kg/m    Body mass index is 30.31 kg/m .  Physical Exam   GENERAL: alert and no distress  BREAST: Bilateral tenderness surrounding nipple without masses, discharge, or nipple inversion.  No redness or warmth.  Tenderness in bilateral axillary regions with questionable lymphadenopathy.  CV: regular rates and rhythm, normal S1 S2, no S3 or S4, and no murmur, click or rub  ORTHO: Cervical Spine Exam: Inspection: normal cervical lordosis, no scapular winging  Tender: Tenderness to palpation at C7.  Non-tender: No tenderness at remaining cervical vertebrae, surrounding paracervical " musculature, trapezius muscles, SCM's.  Range of Motion:  Full ROM of cervical spine      Lumber/Thoracic Spine Exam: Tender: None  Non-tender:  thoracic spinous processes, thoracic facet joints, left parathoracic muscles, right parathoracic muscles  SKIN: no suspicious lesions or rashes  PSYCH: mentation appears normal, affect normal/bright  LYMPH: no cervical adenopathy          Signed Electronically by: Andry Chamberlain PA-C

## 2024-05-20 ENCOUNTER — HOSPITAL ENCOUNTER (OUTPATIENT)
Dept: MAMMOGRAPHY | Facility: CLINIC | Age: 44
Discharge: HOME | End: 2024-05-20
Attending: PHYSICIAN ASSISTANT
Payer: COMMERCIAL

## 2024-05-20 DIAGNOSIS — N64.4 BREAST PAIN: ICD-10-CM

## 2024-05-20 PROCEDURE — 76642 ULTRASOUND BREAST LIMITED: CPT | Mod: LT

## 2024-05-20 PROCEDURE — 77062 BREAST TOMOSYNTHESIS BI: CPT

## 2024-06-11 ENCOUNTER — TRANSFERRED RECORDS (OUTPATIENT)
Dept: HEALTH INFORMATION MANAGEMENT | Facility: CLINIC | Age: 44
End: 2024-06-11
Payer: COMMERCIAL

## 2024-06-25 ENCOUNTER — VIRTUAL VISIT (OUTPATIENT)
Dept: FAMILY MEDICINE | Facility: CLINIC | Age: 44
End: 2024-06-25
Payer: COMMERCIAL

## 2024-06-25 DIAGNOSIS — G43.909 MIGRAINE WITHOUT STATUS MIGRAINOSUS, NOT INTRACTABLE, UNSPECIFIED MIGRAINE TYPE: ICD-10-CM

## 2024-06-25 PROCEDURE — 99213 OFFICE O/P EST LOW 20 MIN: CPT | Mod: 95 | Performed by: PHYSICIAN ASSISTANT

## 2024-06-25 PROCEDURE — G2211 COMPLEX E/M VISIT ADD ON: HCPCS | Mod: 95 | Performed by: PHYSICIAN ASSISTANT

## 2024-06-25 RX ORDER — LINACLOTIDE 145 UG/1
CAPSULE, GELATIN COATED ORAL
COMMUNITY
Start: 2024-06-11

## 2024-06-25 RX ORDER — TOPIRAMATE 25 MG/1
25 TABLET, FILM COATED ORAL 2 TIMES DAILY
Qty: 180 TABLET | Refills: 2 | Status: SHIPPED | OUTPATIENT
Start: 2024-06-25

## 2024-06-25 ASSESSMENT — ENCOUNTER SYMPTOMS: HEADACHES: 1

## 2024-06-25 NOTE — PROGRESS NOTES
Isabel is a 43 year old who is being evaluated via a billable video visit.    How would you like to obtain your AVS? MyChart  If the video visit is dropped, the invitation should be resent by: Text to cell phone: [phone]  Will anyone else be joining your video visit? No      Assessment & Plan     Migraine without status migrainosus, not intractable, unspecified migraine type  Since starting Topamax has seen great improvement in frequency of headaches.  Only 1 headache/migraine since starting.  No side effects.  Will maintain on current regimen and recheck in about 6 months.  - topiramate (TOPAMAX) 25 MG tablet; Take 1 tablet (25 mg) by mouth 2 times daily  Medication use and side effects discussed with the patient. Patient is in complete understanding and agreement with plan.         Subjective   Isabel is a 43 year old, presenting for the following health issues:  Headache (Since starting medication patient is only had one migraine)        6/25/2024     4:37 PM   Additional Questions   Roomed by Kimber GERMAN CMA     Headache          Migraine   Since your last clinic visit, how have your headaches changed?  Improved  How often are you getting headaches or migraines?  1 in the last 1 to 2 months  Are you able to do normal daily activities when you have a migraine? Yes  Are you taking rescue/relief medications? (Select all that apply) Maxalt  How helpful is your rescue/relief medication?  I get some relief  Are you taking any medications to prevent migraines? (Select all that apply)  Topamax  In the past 4 weeks, how often have you gone to urgent care or the emergency room because of your headaches?  0          Objective           Vitals:  No vitals were obtained today due to virtual visit.    Physical Exam   GENERAL: alert and no distress  EYES: Eyes grossly normal to inspection.  No discharge or erythema, or obvious scleral/conjunctival abnormalities.  RESP: No audible wheeze, cough, or visible cyanosis.    SKIN:  Visible skin clear. No significant rash, abnormal pigmentation or lesions.  NEURO: Cranial nerves grossly intact.  Mentation and speech appropriate for age.  PSYCH: Appropriate affect, tone, and pace of words          Video-Visit Details    Type of service:  Video Visit   Originating Location (pt. Location): Home    Distant Location (provider location):  On-site  Platform used for Video Visit: Dileep  Signed Electronically by: Andry Chamberlain PA-C

## 2024-07-18 ENCOUNTER — VIRTUAL VISIT (OUTPATIENT)
Dept: ONCOLOGY | Facility: CLINIC | Age: 44
End: 2024-07-18
Attending: PHYSICIAN ASSISTANT
Payer: COMMERCIAL

## 2024-07-18 DIAGNOSIS — Z80.49 FAMILY HISTORY OF UTERINE CANCER: Primary | ICD-10-CM

## 2024-07-18 DIAGNOSIS — Z80.1 FAMILY HX OF LUNG CANCER: ICD-10-CM

## 2024-07-18 PROCEDURE — 96040 HC GENETIC COUNSELING, EACH 30 MINUTES: CPT | Mod: TEL,95 | Performed by: GENETIC COUNSELOR, MS

## 2024-07-18 NOTE — NURSING NOTE
Current patient location: [facility] [address] [place] [address]    Is the patient currently in the state of [place]? YES    Visit mode:TELEPHONE    If the visit is dropped, the patient can be reconnected by: TELEPHONE VISIT: Phone number:   Telephone Information:   Mobile [phone]       Will anyone else be joining the visit? NO  (If patient encounters technical issues they should call [phone] :[phone])    How would you like to obtain your AVS? MyChart    Are changes needed to the allergy or medication list? N/A    Are refills needed on medications prescribed by this physician? NO    Reason for visit: CONSULT    Negrita GUERRERO

## 2024-07-18 NOTE — PROGRESS NOTES
Virtual Visit Details    Type of service:  Telephone Visit   Phone call duration: 43 minutes   Originating Location (pt. Location): Home  Distant Location (provider location):  Off-site    7/18/2024    Referring Provider: Andry Chamberlain PA-C    Present for Today's Visit: Isabel    Presenting Information:   I met with Isabel Arias today for genetic counseling (telephone visit due to covid19) to discuss her family history of cancer.  She is here today to review this history, cancer screening recommendations, and available genetic testing options.    Personal History:  Isabel is a 43 year old female. She does not have any personal history of cancer.      She had her first menstrual period at age 12, her first child at age 17, and believes she is premenopausal.  She is s/p hysterectomy at age 17 for endometriosis and right oophorectomy in 2005 for an ovarian cyst. She reports past history of oral contraceptive use for about 1 year and that she is currently on estradiol for hormone replacement therapy and has been for the past 15 years.      Her most recent mammogram was a diagnostic mammogram in May 2024 due to bilateral breast pain was normal and her breast density was reported as heterogeneously dense. Most recent colonoscopy in 2021 resulted in the removal of 5 precancerous colon polyps and follow-up was recommended in 3 years per Isabel (no record available as was completed in [place]). She reports that she also had a colonoscopy in 2018 that resulted in the removal of a few colon polyps, but she cannot recall the specific number or pathology. She has her next colonoscopy scheduled later this year. She does not regularly do any other cancer screening at this time.  Isabel reported no tobacco use, and 0-1 drink per week for alcohol use.    Family History: Cancer family history and pertinent information reviewed below (Please see scanned pedigree for detailed family history information)  Paternal half-brother  passed at age 58 from metastatic lung cancer diagnosed at age 55. He did have smoking history.   Maternal half-sister, age 49, has a history of a lung cancer diagnosed at age 49. She does have a past history of smoking, quit when she was diagnosed with some sort of lung disease.   Mother passed at age 39 from metastatic cancer (unknown primary, possibly lung). Isabel reports that she also had lymphoma history in her 30's.   Maternal aunt passed at age 69 from an unspecified type of cancer (unknown primary). This aunt has a daughter who was recently diagnosed with a gynecologic cancer (uterine or ovarian cancer) in her 50's.   Maternal uncle passed in his late 50's from a liver cancer.   Maternal uncle with a history of a throat cancer.   Father passed at age 61 with a bone cancer (unknown if primary or mets). He did have exposure to agent orange.   There is no known Ashkenazi Jehovah's witness ancestry on either side of her family. There is no reported consanguinity.    Discussion:  Isabel's family history of cancer may be suggestive of a hereditary cancer syndrome.  We reviewed the features of sporadic, familial, and hereditary cancers. In looking at Isabel's family history, it is possible that a cancer susceptibility gene is present due to her family history history of multiple early onset cancers. I explained that it is difficult to accurately assess risk for a hereditary cancer syndrome without know the primary cancer sites for some of her relatives. She expressed understanding.   We discussed the natural history and genetics of hereditary cancers. A detailed handout regarding the information we discussed will be sent to Isabel via [employer]. Topics included: inheritance pattern, cancer risks, cancer screening recommendations, and also risks, benefits and limitations of testing.  We reviewed some of the common hereditary cancer syndromes such as BRCA1/2 and Herzog syndrome. We reviewed our lack of knowledge about hereditary  lung cancer and that informative actionable genetic testing is not yet clinically available outside of Li Fraumeni syndrome and EGFR.    We discussed that insurance coverage for genetic testing is typically dependent on a personal or family history being suggestive of a hereditary cancer syndrome. Currently, Rhetts personal and family history does not meet criteria for genetic testing for a specific hereditary cancer syndrome. We discussed options for proceeding with genetic testing via insurance vs patient pay. After our discussion, Isabel was comfortable proceeding with comprehensive genetic testing vis the patient pay option through [facility].   We discussed that there are additional genes that could cause increased risk for hereditary cancer. As many of these genes present with overlapping features in a family and accurate cancer risk cannot always be established based upon the pedigree analysis alone, it would be reasonable for Isabel to consider panel genetic testing to analyze multiple genes at once.  We reviewed genetic testing options given Isabel's family history including targeted and expanded options. After our discussion, Isabel opted to proceed with genetic testing via BRCA1/2 analysis with automatic reflex to the Multi-Cancer panel through [facility].   The [facility] Multi-Cancer Panel analyzes 70 genes associated with an increased risk for cancer: AIP, ALK, APC, CAMRON, AXIN2, BAP1, BARD1, BLM, BMPR1A, BRCA1, BRCA2, BRIP1, CDC73, CDH1, CDK4, CDKN1B, CDKN2A, CHEK2, CTNNA1, DICER1, EGFR, EPCAM, FH, FLCN, GREM1, HOXB13, KIT, LZTR1, MAX, MBD4, MEN1, MET, MITF, MLH1, MSH2, MSH3, MSH6, MUTYH, NF1, NF2, NTHL1, PALB2, PDGFRA, PMS2, POLD1, POLE, POT1, XOGOP4T, PTCH1, PTEN, RAD51C, RAD51D, RB1, RET, SDHA, SDHAF2, SDHB, SDHC, SDHD, SMAD4, SMARCA4, SMARCB1, SMARCE1, STK11, SUFU, LRLF583, TP53, TSC1, TSC2, VHL.  This panel includes genes associated with hereditary cancers across multiple major organ systems,  including:  breast and gynecologic (breast, ovarian, uterine)  gastrointestinal (colorectal, gastric, pancreatic)  endocrine (thyroid, paraganglioma/pheochromocytoma, parathyroid, pituitary)  genitourinary (renal/urinary tract, prostate)  skin (melanoma, basal cell carcinoma)  brain/nervous system   sarcoma  hematologic (myelodysplastic syndrome/leukemia)  Individuals who are found to carry a pathogenic variant in one of these genes have an increased risk of developing certain cancers/tumors.  Identifying those at elevated risk may guide implementation of additional screening, surveillance and interventions.    Medical Management: For Isabel, we reviewed that the information from genetic testing may determine:  additional cancer screening for which Isabel may qualify (i.e. mammogram and breast MRI, more frequent colonoscopies, more frequent dermatologic exams, etc.),  options for risk reducing surgeries Isabel could consider (i.e. bilateral mastectomy, surgery to remove her ovaries and/or uterus, etc.),    and targeted chemotherapies for Isabel if she were to develop certain cancers in the future (i.e. immunotherapy for individuals with Herzog syndrome, PARP inhibitors, etc.).   Verbal consent obtained over phone today with no witness required. A copy of the lab's consent form will be sent to Isabel via [employer] for the rest of us for review.   These recommendations will be discussed in detail once genetic testing is completed.   Isabel opted to have a salivia kit sent to her home to complete the genetic testing. A kit was ordered from [employer] and a saliva collection kit will be sent to Isabel's home address. I confirmed with Isabel that the address we have on file is her current and correct address. We discussed some of the limitations of completing genetic testing via saliva and Isabel was instructed to follow the instruction provided in the kit to ensure the best possibility of success for saliva genetic testing.     Plan:  1) Today  Isabel elected to proceed with BRCA1/2 analysis with automatic reflex to Multi-Cancer panel genetic testing (70 genes) through [facility].  2) This information should be available in approximately 2-3 weeks from the time the lab receives her sample.  3) Isabel will be contacted by our scheduling department to set up a result disclosure appointment.     Nereida Guerrero MS, CGC  Licensed, Certified Genetic Counselor  [facility]  [email or web address]

## 2024-07-18 NOTE — LETTER
7/18/2024      Isabel Arias  [address]  [place] [address]      Dear Colleague,    Thank you for referring your patient, Isabel Arias, to the [facility]. Please see a copy of my visit note below.    Virtual Visit Details    Type of service:  Telephone Visit   Phone call duration: 43 minutes   Originating Location (pt. Location): Home  Distant Location (provider location):  Off-site    7/18/2024    Referring Provider: Andry Chamberlain PA-C    Present for Today's Visit: Isabel    Presenting Information:   I met with Isabel Arias today for genetic counseling (telephone visit due to covid19) to discuss her family history of cancer.  She is here today to review this history, cancer screening recommendations, and available genetic testing options.    Personal History:  Isabel is a 43 year old female. She does not have any personal history of cancer.      She had her first menstrual period at age 12, her first child at age 17, and believes she is premenopausal.  She is s/p hysterectomy at age 17 for endometriosis and right oophorectomy in 2005 for an ovarian cyst. She reports past history of oral contraceptive use for about 1 year and that she is currently on estradiol for hormone replacement therapy and has been for the past 15 years.      Her most recent mammogram was a diagnostic mammogram in May 2024 due to bilateral breast pain was normal and her breast density was reported as heterogeneously dense. Most recent colonoscopy in 2021 resulted in the removal of 5 precancerous colon polyps and follow-up was recommended in 3 years per Isabel (no record available as was completed in [place]). She reports that she also had a colonoscopy in 2018 that resulted in the removal of a few colon polyps, but she cannot recall the specific number or pathology. She has her next colonoscopy scheduled later this year. She does not regularly do any other cancer screening at this time.   Isabel reported no tobacco use, and 0-1 drink per week for alcohol use.    Family History: Cancer family history and pertinent information reviewed below (Please see scanned pedigree for detailed family history information)  Paternal half-brother passed at age 58 from metastatic lung cancer diagnosed at age 55. He did have smoking history.   Maternal half-sister, age 49, has a history of a lung cancer diagnosed at age 49. She does have a past history of smoking, quit when she was diagnosed with some sort of lung disease.   Mother passed at age 39 from metastatic cancer (unknown primary, possibly lung). Isabel reports that she also had lymphoma history in her 30's.   Maternal aunt passed at age 69 from an unspecified type of cancer (unknown primary). This aunt has a daughter who was recently diagnosed with a gynecologic cancer (uterine or ovarian cancer) in her 50's.   Maternal uncle passed in his late 50's from a liver cancer.   Maternal uncle with a history of a throat cancer.   Father passed at age 61 with a bone cancer (unknown if primary or mets). He did have exposure to agent orange.   There is no known Ashkenazi Zoroastrianism ancestry on either side of her family. There is no reported consanguinity.    Discussion:  Isabel's family history of cancer may be suggestive of a hereditary cancer syndrome.  We reviewed the features of sporadic, familial, and hereditary cancers. In looking at Isabel's family history, it is possible that a cancer susceptibility gene is present due to her family history history of multiple early onset cancers. I explained that it is difficult to accurately assess risk for a hereditary cancer syndrome without know the primary cancer sites for some of her relatives. She expressed understanding.   We discussed the natural history and genetics of hereditary cancers. A detailed handout regarding the information we discussed will be sent to Isabel via [employer]. Topics included: inheritance pattern,  cancer risks, cancer screening recommendations, and also risks, benefits and limitations of testing.  We reviewed some of the common hereditary cancer syndromes such as BRCA1/2 and Herzog syndrome. We reviewed our lack of knowledge about hereditary lung cancer and that informative actionable genetic testing is not yet clinically available outside of Li Fraumeni syndrome and EGFR.    We discussed that insurance coverage for genetic testing is typically dependent on a personal or family history being suggestive of a hereditary cancer syndrome. Currently, Rhetts personal and family history does not meet criteria for genetic testing for a specific hereditary cancer syndrome. We discussed options for proceeding with genetic testing via insurance vs patient pay. After our discussion, Isabel was comfortable proceeding with comprehensive genetic testing vis the patient pay option through [facility].   We discussed that there are additional genes that could cause increased risk for hereditary cancer. As many of these genes present with overlapping features in a family and accurate cancer risk cannot always be established based upon the pedigree analysis alone, it would be reasonable for Isabel to consider panel genetic testing to analyze multiple genes at once.  We reviewed genetic testing options given Isabel's family history including targeted and expanded options. After our discussion, Isabel opted to proceed with genetic testing via BRCA1/2 analysis with automatic reflex to the Multi-Cancer panel through [facility].   The [facility] Multi-Cancer Panel analyzes 70 genes associated with an increased risk for cancer: AIP, ALK, APC, CAMRON, AXIN2, BAP1, BARD1, BLM, BMPR1A, BRCA1, BRCA2, BRIP1, CDC73, CDH1, CDK4, CDKN1B, CDKN2A, CHEK2, CTNNA1, DICER1, EGFR, EPCAM, FH, FLCN, GREM1, HOXB13, KIT, LZTR1, MAX, MBD4, MEN1, MET, MITF, MLH1, MSH2, MSH3, MSH6, MUTYH, NF1, NF2, NTHL1, PALB2, PDGFRA, PMS2, POLD1, POLE, POT1, WYRQH2P, PTCH1, PTEN,  RAD51C, RAD51D, RB1, RET, SDHA, SDHAF2, SDHB, SDHC, SDHD, SMAD4, SMARCA4, SMARCB1, SMARCE1, STK11, SUFU, NWHB449, TP53, TSC1, TSC2, VHL.  This panel includes genes associated with hereditary cancers across multiple major organ systems, including:  breast and gynecologic (breast, ovarian, uterine)  gastrointestinal (colorectal, gastric, pancreatic)  endocrine (thyroid, paraganglioma/pheochromocytoma, parathyroid, pituitary)  genitourinary (renal/urinary tract, prostate)  skin (melanoma, basal cell carcinoma)  brain/nervous system   sarcoma  hematologic (myelodysplastic syndrome/leukemia)  Individuals who are found to carry a pathogenic variant in one of these genes have an increased risk of developing certain cancers/tumors.  Identifying those at elevated risk may guide implementation of additional screening, surveillance and interventions.    Medical Management: For Isabel, we reviewed that the information from genetic testing may determine:  additional cancer screening for which Isabel may qualify (i.e. mammogram and breast MRI, more frequent colonoscopies, more frequent dermatologic exams, etc.),  options for risk reducing surgeries Isabel could consider (i.e. bilateral mastectomy, surgery to remove her ovaries and/or uterus, etc.),    and targeted chemotherapies for Isabel if she were to develop certain cancers in the future (i.e. immunotherapy for individuals with Herzog syndrome, PARP inhibitors, etc.).   Verbal consent obtained over phone today with no witness required. A copy of the lab's consent form will be sent to Isabel via [employer] for review.   These recommendations will be discussed in detail once genetic testing is completed.   Isabel opted to have a salivia kit sent to her home to complete the genetic testing. A kit was ordered from [employer] and a saliva collection kit will be sent to Isabel's home address. I confirmed with Isabel that the address we have on file is her current and correct address. We  discussed some of the limitations of completing genetic testing via saliva and Isabel was instructed to follow the instruction provided in the kit to ensure the best possibility of success for saliva genetic testing.     Plan:  1) Today Isabel elected to proceed with BRCA1/2 analysis with automatic reflex to Multi-Cancer panel genetic testing (70 genes) through [facility].  2) This information should be available in approximately 2-3 weeks from the time the lab receives her sample.  3) Isabel will be contacted by our scheduling department to set up a result disclosure appointment.     Nereida Guerrero MS, [facility]  Licensed, Certified Genetic Counselor  [facility]  [email or web address]        Again, thank you for allowing me to participate in the care of your patient.        Sincerely,        Nereida Mccarty, GC

## 2024-07-19 NOTE — PATIENT INSTRUCTIONS
Assessing Cancer Risk  Cancer is a common diagnosis which impacts many families.  Individuals may develop cancer due to environmental factors (such as exposures and lifestyle), aging, genetic predisposition, or a combination of these factors.      Only about 5-10% of cancers are thought to be due to an inherited cancer susceptibility gene.    These families often have:  Several people with the same or related types of cancer  Cancers diagnosed at a young age (before age 50)  Individuals with more than one primary cancer  Multiple generations of the family affected with cancer    Comprehensive Breast and Gynecologic Cancer Panel  We each inherit two copies of every gene in our bodies: one from our mother, and one from our father. Each gene has a specific job to do.  When a gene has a mistake or  mutation  in it, it does not work like it should.     Some people may be candidates for genetic testing of more than one gene.  For these families, genetic testing using a cancer panel may be offered. These panels will test different genes at once known to increase the risk for breast, ovarian, uterine, and/or other cancers.    This handout will review common hereditary breast and gynecologic cancer syndromes. The genes that will be discussed in this handout are: CAMRON, BRCA1, BRCA2, BRIP1, CDH1, CHEK2, MLH1, MSH2, MSH6, PMS2, EPCAM, PTEN, PALB2, RAD51C, RAD51D, and TP53.    The purpose of this handout is to serve as a brief summary of the breast and gynecologic cancer risk genes that have published clinical management guidelines for individuals who are found to carry a mutation. Inheriting a mutation does not mean a person will develop cancer, but it does significantly increase their risk above the general population risk.     ______________________________________________________________________________    Hereditary Breast and Ovarian Cancer Syndrome (BRCA1 and BRCA2)  A single mutation in one of the copies of BRCA1 or  BRCA2 increases the risk for breast and ovarian cancer, among others.  The risk for pancreatic cancer and melanoma may also be slightly increased in some families.  The chart below shows the chance that someone with a BRCA mutation would develop cancer in his or her lifetime1,2,3,4.       Lifetime Cancer Risks    General Population BRCA1  BRCA2   Breast  12% >60% >60%   Ovarian  1-2% 39-58% 13-29%   Prostate 12% 7-26% 19-61%   Male Breast 0.1% 0.2-1.2% 1.8-7.1%   Pancreas 1-2% Up to 5% 5-10%     A person s ethnic background is also important to consider, as individuals of Ashkenazi Yarsani ancestry have a higher chance of having a BRCA gene mutation.  There are three BRCA mutations that occur more frequently in this population.      Herzog Syndrome (MLH1, MSH2, MSH6, PMS2, and EPCAM)  Currently five genes are known to cause Herzog Syndrome: MLH1, MSH2, MSH6, PMS2, and EPCAM.  A single mutation in one of the Herzog Syndrome genes increases the risk for colon, endometrial, ovarian, and stomach cancers.  Other cancers that occur less commonly in Herzog Syndrome include urinary tract, skin, and brain cancers.  The chart below shows the chance that a person with Herzog syndrome would develop cancer in his or her lifetime5.      Lifetime Cancer Risks    General Population Herzog Syndrome   Colon 5% 10-61%   Endometrial 3% 13-57%   Ovarian 1-2% 1-38%   Stomach <1% 1-9%   *Cancer risk varies depending on Herzog syndrome gene found      Cowden Syndrome (PTEN)  Cowden syndrome is a hereditary condition that increases the risk for breast, thyroid, endometrial, colon, and kidney cancer.  Cowden syndrome is caused by a mutation in the PTEN gene.  A single mutation in one of the copies of PTEN causes Cowden syndrome and increases cancer risk.  The chart below shows the chance that someone with a PTEN mutation would develop cancer in their lifetime6,7.  Other benign features seen in some individuals with Cowden syndrome include benign  skin lesions (facial papules, keratoses, lipomas), learning disability, autism, thyroid nodules, colon polyps, and larger head size.     Lifetime Cancer Risks    General Population Cowden   Breast 12% 40-60%*   Thyroid 1% Up to 38%   Renal 1-2% Up to 35%   Endometrial 3% Up to 28%   Colon 5% Up to 9%   Melanoma 2-3% Up to 6%   *Emerging data suggests the risk for breast cancer could be greater than 60%               Li-Fraumeni Syndrome (TP53)  Li-Fraumeni Syndrome (LFS) is a cancer predisposition syndrome caused by a mutation in the TP53 gene. A single mutation in one of the copies of TP53 increases the risk for multiple cancers. Individuals with LFS are at an increased risk for developing cancer at a young age. The lifetime risk for development of a LFS-associated cancer is 50% by age 30 and 90% by age 60.   Core Cancers: Sarcomas, Breast, Brain, Lung, Leukemias/Lymphomas, Adrenocortical carcinomas  Other Cancers: Gastrointestinal, Thyroid, Skin, Genitourinary       Hereditary Diffuse Gastric Cancer (CDH1)  Currently, one gene is known to cause hereditary diffuse gastric cancer (HDGC): CDH1.  Individuals with HDGC are at increased risk for diffuse gastric cancer and lobular breast cancer. Of people diagnosed with HDGC, 30-50% have a mutation in the CDH1 gene.  This suggests there are likely other genes that may cause HDGC that have not been identified yet.      Lifetime Cancer Risks    General Population HDGC   Diffuse Gastric  <1% ~80%   Breast 12% 41-60%       Additional Genes    CAMRON  CAMRON is a moderate-risk breast cancer gene. Women who have a mutation in CAMRON can have between a 2-4 fold increased risk for breast cancer compared to the general population8. ACMRON mutations have also been associated with increased risk for pancreatic cancer between 5-10%9. Individuals who inherit two CAMRON mutations have a condition called ataxia-telangiectasia (AT).  This rare autosomal recessive condition affects the nervous system  and immune system, and is associated with progressive cerebellar ataxia beginning in childhood. Individuals with ataxia-telangiectasia often have a weakened immune system and have an increased risk for childhood cancers.    PALB2  Mutations in PALB2 have been shown to increase the risk of breast cancer up to 41-60% in some families; where individuals fall within this risk range is dependent upon family [id number]. PALB2 mutations have also been associated with increased risk for pancreatic cancer between 5-10%.  Individuals who inherit two PALB2 mutations--one from their mother and one from their father--have a condition called Fanconi Anemia.  This rare autosomal recessive condition is associated with short stature, developmental delay, bone marrow failure, and increased risk for childhood cancers.    CHEK2   CHEK2 is a moderate-risk breast cancer gene.  Women who have a mutation in CHEK2 have around a 2-4 fold increased risk for breast cancer compared to the general population, and this risk may be higher depending upon family history.11,12,13 The risk of colon cancer may be twice as high as the general population risk of colon cancer of 5%. Mutations in CHEK2 have also been shown to increase the risk of other cancers, including prostate, however these cancer risks are currently not well understood.    BRIP1, RAD51C and RAD51D  Mutations in RAD51C and RAD51D have been shown to increase the risk of ovarian cancer and breast cancer 14,. Mutations in BRIP1 have been shown to increase the risk of ovarian cancer and possibly female breast cancer 15 .       Lifetime Cancer Risk    General Population        BRIP1   RAD51C  RAD51D   Breast 12% Not well defined 20-40% 20-40%   Ovarian 1-2% 5-15% 10-15% 10-20%     ______________________________________________________________  Inheritance  All of the cancer syndromes reviewed above are inherited in an autosomal dominant pattern.  This means that if a parent has a mutation,  each of their children will have a 50% chance of inheriting that same mutation. Therefore, each child --male or female-- would have a 50% chance of being at increased risk for developing cancer.    Image obtained from Genetics Home Reference, 2013     Mutations in some genes can occur de rajeev, which means that a person s mutation occurred for the first time in them and was not inherited from a parent.  Now that they have the mutation, however, it can be passed on to future generations.    Genetic Testing  Genetic testing involves a blood test and will look for any harmful mutations that are associated with increased cancer risk.  If possible, it is recommended that the person(s) who has had cancer be tested before other family members.  That person will give us the most useful information about whether or not a specific gene is associated with the cancer in the family.    Results  There are three possible results of genetic testing:  Positive--a harmful mutation was identified in one or more of the genes  Negative--no mutations were identified in any of the genes tested  Variant of unknown significance--a variation in one of the genes was identified, but it is unclear how this impacts cancer risk in the family    Advantages and Disadvantages   There are advantages and disadvantages to genetic testing.    Advantages  May clarify your cancer risk  Can help you make medical decisions  May explain the cancers in your family  May give useful information to your family members (if you share your results)    Disadvantages  Possible negative emotional impact of learning about inherited cancer risk  Uncertainty in interpreting a negative test result in some situations  Possible genetic discrimination concerns (see below)    Genetic Information Nondiscrimination Act (JAYDON)  The Genetic Information Nondiscrimination Act of 2008 (JAYDON) is a federal law that protects individuals from health insurance or employment discrimination  based on a genetic test result alone (with some exceptions, including employers with fewer than 15 employees, and ).  Although rare, JAYDON  does not cover discrimination protections in terms of life insurance, long term care, or disability insurances.  Visit the [employer] [place] website to learn more.    Reducing Cancer Risk  All of the genes described in this handout have nationally recognized cancer screening guidelines that would be recommended for individuals who test positive.  In addition to increased cancer screening, surgeries may be offered or recommended to reduce cancer risk.  Recommendations are based upon an individual s genetic test result as well as their personal and family history of cancer.    Questions to Think About Regarding Genetic Testing:  What effect will the test result have on me and my relationship with my family members if I have an inherited gene mutation?  If I don t have a gene mutation?  Should I share my test results, and how will my family react to this news, which may also affect them?  Are my children ready to learn new information that may one day affect their own health?    Hereditary Cancer Resources    FORCE: Facing Our Risk of Cancer Empowered [email or web address]   Bright Pink bebrightpink.[email or web address]   Li-Fraumeni Syndrome Association lfsassociation.org   PTEN World PTENworld.com   No stomach for cancer, Inc. nostomachforcancer.org   Stomach cancer relief network Scrnet.org   Collaborative Group of the Americas on Inherited Colorectal Cancer (CGA) cgaicc.com    Cancer Care cancercare.org   American Cancer Society (ACS) cancer.org   National Cancer [place] (NCI) cancer.gov     Please call us if you have any questions or concerns.   Cancer Risk Management Program [phone] ([phone])    References  Johnny Chowdhury PDP, Jose S, Nathan BROOKS, Briseida MURRY, Joy MCLEAN, Camille N, Lee H, Evie O, Deyanira A, Tierney B, Eric P, Ericka S,  Supa DM, Arsenio N, Mauricio E, Mendoza H, Doroteo E, Grace J, Magalys J, Jaye B, Bennett H, Albertci S, Eejunea H, Katarina H, Alejandra K, [place] OP. Average risks of breast and ovarian cancer associated with BRCA1 or BRCA2 mutations detected in case series unselected for family history: a combined analysis of 222 studies. Am J Hum Smiley. 2003;72:1117-30.  Danelle N, Marimar DEXTER, Ale G.  BRCA1 and BRCA2 Hereditary Breast and Ovarian Cancer. Gene Reviews online. 2013.  Rj YC, Addis S, Sherine G, Aj S. Breast cancer risk among male BRCA1 and BRCA2 mutation carriers. J Natl Cancer Inst. 2007;99:1811-4.  Garrett DHALIWAL, Tenzin I, Horacio J, Aicha E, Evin ER, Tate F. Risk of breast cancer in male BRCA2 carriers. J Med Smiley. 2010;47:710-1.  National Comprehensive Cancer Network. Clinical practice guidelines in oncology, colorectal cancer screening. Available online (registration required). 2015.  Santiago MH, Mirella J, Gladis J, Abigail LA, [place]o MS, Eng C. Lifetime cancer risks in individuals with germline PTEN mutations. Clin Cancer Res. 2012;18:400-7.  Flora PHAN. Cowden Syndrome: A Critical Review of the Clinical Literature. J Smiley . 2009:18:13-27.  Mike A, Francisco D, Mary S, Dhara P, Chagtai T, Luther M, Reji B, Pepe H, Faby R, Lacie K, Marlen L, Garrett DHALIWAL, Supa FAULKNER, Bsimark DF, Anabela MR, The Breast Cancer Susceptibility Collaboration (UK) & Oscar N. CAMRON mutations that cause ataxia-telangiectasia are breast cancer susceptibility alleles. Nature Genetics. 2006;38:873-875  Alvarado DUBOSE , Reina Y, Kathleen GREER, Chloé GERMAN, Mariel GM , Janelle ML, Dayanainger S, Castellanos AG, Syngal S, Anjelica ML, Christel J , Samina R, Carrillo SZ, Kathrin JR, Nida VE, Marybeth M, Vogelstein B, Anuel N, Alejandro RH, Laine KW, and Kathleen AP. CAMRON mutations in patients with hereditary pancreatic cancer. Cancer Discover. 2012;2:41-46  Abdulaizz JEWELL et al. Breast-Cancer Risk in Families with  Mutations in PALB2. NEJM. 2014; 371(6):497-506.  CHEK2 Breast Cancer Case-Control Consortium. CHEK2*1100delC and susceptibility to breast cancer: A collaborative analysis involving 10,860 breast cancer cases and 9,065 controls from 10 studies. Am J Hum Smiley, 74 (2004), pp. 1254-6838  Tom T, Antwon S, Brandon K, et al. Spectrum of Mutations in BRCA1, BRCA2, CHEK2, and TP53 in Families at High Risk of Breast Cancer. RIOS. 2006;295(12):9811-1837.   Chang C, Alejo D, Naif A, et al. Risk of breast cancer in women with a CHEK2 mutation with and without a family history of breast cancer. J Clin Oncol. 2011;29:4891-6223.  David H, Ami E, Katie SJ, et al. Contribution of germline mutations in the RAD51B, RAD51C, and RAD51D genes to ovarian cancer in the population. J Clin Oncol. 2015;33(26):5513-4528. Doi:10.1200/JCO.2015.61.2408.  Yue T, Dianelys DF, Nicki P, et al. Mutations in BRIP1 confer high risk of ovarian cancer. Rimma Smiley. 2011;43(11):9164-7058. doi:10.1038/ng.955.

## 2024-08-26 NOTE — PROGRESS NOTES
"Virtual Visit Details    Type of service:  Telephone Visit   Phone call duration: 12 minutes   Originating Location (pt. Location): Home  Distant Location (provider location):  Off-site    8/27/2024    Referring Provider: Andry Chamberlain PA-C    Presenting Information:  I spoke to Isabel by phone today to discuss her genetic testing results. She submitted a saliva sample to [facility] on 7/29/2024 and Multi-Cancer panel testing was ordered. This testing was done because of Isabel's family history of multiple cancers.    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 No other variants or mutations were found in the NF1 gene. Given the uncertain significance of this result, medical management decisions should NOT be made based on this test result alone.    Isabel is negative for mutations in the AIP, ALK, APC, CAMRON, AXIN2, BAP1, BARD1, BLM, BMPR1A, BRCA1, BRCA2, BRIP1, CDC73, CDH1, CDK4, CDKN1B, CDKN2A, CHEK2, CTNNA1, DICER1, EGFR, EPCAM, FH, FLCN, GREM1, HOXB13, KIT, LZTR1, MAX, MBD4, MEN1, MET, MITF, MLH1, MSH2, MSH3, MSH6, MUTYH, NF1, NF2, NTHL1, PALB2, PDGFRA, PMS2, POLD1, POLE, POT1, EESKQ6K, PTCH1, PTEN, RAD51C, RAD51D, RB1, RET, SDHA, SDHAF2, SDHB, SDHC, SDHD, SMAD4, SMARCA4, SMARCB1, SMARCE1, STK11, SUFU, TYTC097, TP53, TSC1, TSC2, and VHL genes.      No mutations were found in any of the 70 genes analyzed. This test involved sequencing and deletion/duplication analysis of all genes with the exceptions of EPCAM and GREM1 (deletions/duplications only), MITF (only the status of the c.952G>A (p.E318K) alteration is analyzed and reported), and SDHA (sequencing only).      A copy of the test report can be found in the Laboratory tab, dated 7/29/2024, and named \"LABORATORY MISCELLANEOUS ORDER\". The report is scanned in as a linked document.    Interpretation:  We " discussed several different interpretations of this inconclusive test result. It is not clear if this variant in the NF1 gene is associated with increased cancer risk.  1. This variant may be a benign change that does not increase cancer risk.  2. This variant may be a harmful mutation that causes increased risk for certain cancers/tumors.    NF1 (gain exon 57)  Harmful mutations in the NF1 gene are associated with a diagnosis of neurofibromatosis type 1 and increased risk for breast cancer. Individuals with neurofibromatosis type 1 also have multiple café au lait spots (birthmarks), freckling in the armpits and groin areas, multiple cutaneous neurofibromas (skin tumors), iris Lisch nodules (small masses on the eye), and learning difficulties among others. Isabel denied a personal and family history of the above features. This suggests that the chances that this variant is a harmful mutation in this family are low. If Isabel or her providers become concerned, Isabel can have a physical exam by General Genetics or the NF clinic to look for subtle features of NF1. We discussed that medical management decisions are NOT recommended for individuals with a VUS result.    Genetic testing labs are working to collect evidence about if this variant is harmful or benign, and they will contact me if it is reclassified. One explanation for Isabel's unexplained family history of cancer may be that there is a different gene or combination of genes and environment that are associated with the cancers in this family. It is also important to consider that other relatives with cancer history may have had a mutation in one of the genes tested, and she did not inherit it.  There is also a small possibility that there is a mutation in one of these genes, and we could not find it with our current testing methods.       Inheritance:  We reviewed the autosomal dominant inheritance of this variant in the NF1 gene. We discussed that Isabel  has a 50% chance to pass this variant to each of her children. Likewise, each of her siblings has a 50% risk of having the same variant. Because it is unclear what, if any, risk is associated with this variant, clinical genetic testing for this NF1 variant alone is not recommended for relatives.    Family Studies:  The laboratory may offer additional research based testing for specific relatives in order to help reclassify this variant.    Screening:  Based on this inconclusive test result, it is important for Isabel and her relatives to refer back to the family history for appropriate cancer screening.    Isabel should continue to follow her GI providers recommendations for colonoscopy screening. Given that she had polyps on her last colonoscopy, 3 year follow-up was recommended. She has a colonoscopy scheduled for next month.   We discussed that Isabel likely has some increased risk for lymphoma given her mother's history, but additional screening is typically not recommended at this time.  Isabel is encouraged to discuss this history with her care providers.    Other population cancer screening options, such as those recommended by the American Cancer Society and the National Comprehensive Cancer Network (NCCN), are also appropriate for Isabel and her family. These screening recommendations may change if there are changes to Isabel's personal and/or family history. Final screening recommendations should be made by each individual's managing physician.    Additional Testing Considerations:  Although Isabel's genetic testing result is inconclusive, other relatives may still carry a harmful gene mutation associated with hereditary cancer. Genetic counseling is recommended for her maternal cousin with uterine cancer history to discuss genetic testing options. If any of these relatives do pursue genetic testing, Isabel is encouraged to contact me so that we may review the impact of their test results on  her.    Summary:  We do not have an explanation for Isabel's family history of cancer. While no genetic changes were identified, Isabel may still be at risk for certain cancers due to family history, environmental factors, or other genetic causes not identified by this test.  Because of that, it is important that she continue with cancer screening based on her personal and family history as discussed above.    Genetic testing is rapidly advancing, and new cancer susceptibility genes will most likely be identified in the future. Therefore, I encouraged Isabel to contact me annually or if there are changes in her personal or family history. This may change how we assess her cancer risk, screening, and the testing we would offer.    Plan:  1.  A copy of the test results will be sent to Isabel.    2.  She plans to follow-up with her primary care providers for routine care and cancer screening.  3.  She should contact me periodically, or if her  family history changes, and she would like to know if there are additional screening or testing recommendations at that time.    4. I will attempt to contact Isabel if the laboratory informs me that this VUS has been reclassified.  This may change screening and testing recommendations for Isabel and her relatives.    Nereida Guerrero MS, [facility]  Licensed, Certified Genetic Counselor  [facility]  [email or web address]

## 2024-08-27 ENCOUNTER — VIRTUAL VISIT (OUTPATIENT)
Dept: ONCOLOGY | Facility: CLINIC | Age: 44
End: 2024-08-27
Attending: GENETIC COUNSELOR, MS
Payer: COMMERCIAL

## 2024-08-27 DIAGNOSIS — Z80.49 FAMILY HISTORY OF UTERINE CANCER: Primary | ICD-10-CM

## 2024-08-27 DIAGNOSIS — Z80.1 FAMILY HX OF LUNG CANCER: ICD-10-CM

## 2024-08-27 PROCEDURE — 999N000069 HC STATISTIC GENETIC COUNSELING, < 16 MIN: Mod: TEL,95 | Performed by: GENETIC COUNSELOR, MS

## 2024-08-27 NOTE — NURSING NOTE
Current patient location:  Work    Is the patient currently in the state of [place]? YES    Visit mode:TELEPHONE    If the visit is dropped, the patient can be reconnected by: TELEPHONE VISIT: Phone number:   Telephone Information:   Mobile [phone]       Will anyone else be joining the visit? NO  (If patient encounters technical issues they should call [phone] :[phone])    How would you like to obtain your AVS? MyChart    Are changes needed to the allergy or medication list? N/A    Are refills needed on medications prescribed by this physician? NO    Rooming Documentation:  Questionnaire(s) not done per department protocol      Reason for visit: RECHECK    Magnus GARCIAF

## 2024-08-27 NOTE — PATIENT INSTRUCTIONS
Genetic Testing  Genetic testing involved a blood or saliva test which looked at the genetic information in select genes for variants associated with cancer risk.  This testing may have included analysis of a single gene due to a known variant in the family, multiple genes most associated with the cancers in a family, or an expanded panel of genes related to many types of cancers.    Results  There are several possible genetic test results, including:   Positive--a harmful mutation (also known as a  pathogenic  or  likely pathogenic  variant) was identified in a gene associated with increased cancer risk.  These risks, as well as medical management options, depend on the specific genetic variant identified.    Negative--no variants were identified in the genes analyzed   Variant of unknown significance--a variant was identified in one or more genes, though it is currently unclear how this impacts cancer risk in the family.  Genetic testing labs are working to collect evidence about these uncertain variants and may provide updates in the future.    What is a Variant of Unknown Significance?  A variant of unknown significance (VUS) is a genetic change with unclear clinical significance.  Scientists currently do not know if this specific variant is associated with increased cancer risks,  or if it is a benign (harmless) change with no impact on health.       A variant may be of uncertain significance for several reasons.  It is possible that this specific variant has not been seen before by the laboratory, or only in a small number of families.  There is currently not enough information to know how this variant may impact your health.          Reclassification  Genetic testing laboratories and researchers are collecting evidence to determine the importance of variants of unknown significance.  Many variants of uncertain significance are later reclassified as benign findings, however some may be associated with  increased cancer risk.  Laboratories will often notify the genetic counselor/ordering provider when a patient s VUS has been reclassified.        Some families may be candidates for participation in the laboratory s variant research programs to help determine the importance of their VUS.  Participating in these programs does not guarantee that families will learn the significance of their VUS immediately.  It could be months or years before a VUS is reclassified.       Screening Recommendations  A combination of personal and family history factors may inform cancer risk and medical management recommendations.  Population cancer screening options, such as those recommended by the American Cancer Society and the National Comprehensive Cancer Network (NCCN) are appropriate for many families at average risk for cancer.  However, earlier and/or more frequent screening may be recommended based on personal factors (lifestyle, exposures, medications, screening results), family history of cancer, and sometimes genetic factors.  These cancer risk management options should be discussed in more detail with an individual's medical providers.      It is usually not recommended that other relatives have genetic testing for the VUS unless it is done as part of the laboratory s variant research program because an individual s test results should not influence their cancer screening until we determine the importance of the VUS.  Your genetic counselor can help you and your relatives understand the risks and benefits of all genetic testing and cancer screening options.    Please call us if you have any questions or concerns.   Cancer Risk Management Program (Appointments: [phone])

## 2024-08-27 NOTE — LETTER
8/27/2024      Isabel Arias  [address]  [facility] [address]      Dear Colleague,    Thank you for referring your patient, Isabel Arias, to the [facility]. Please see a copy of my visit note below.    Virtual Visit Details    Type of service:  Telephone Visit   Phone call duration: 12 minutes   Originating Location (pt. Location): Home  Distant Location (provider location):  Off-site    8/27/2024    Referring Provider: Andry Chamberlain PA-C    Presenting Information:  I spoke to Isabel by phone today to discuss her genetic testing results. She submitted a saliva sample to [facility] on 7/29/2024 and Multi-Cancer panel testing was ordered. This testing was done because of Isabel's family history of multiple cancers.    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 No other variants or mutations were found in the NF1 gene. Given the uncertain significance of this result, medical management decisions should NOT be made based on this test result alone.    Isabel is negative for mutations in the AIP, ALK, APC, CAMRON, AXIN2, BAP1, BARD1, BLM, BMPR1A, BRCA1, BRCA2, BRIP1, CDC73, CDH1, CDK4, CDKN1B, CDKN2A, CHEK2, CTNNA1, DICER1, EGFR, EPCAM, FH, FLCN, GREM1, HOXB13, KIT, LZTR1, MAX, MBD4, MEN1, MET, MITF, MLH1, MSH2, MSH3, MSH6, MUTYH, NF1, NF2, NTHL1, PALB2, PDGFRA, PMS2, POLD1, POLE, POT1, TDQNK4N, PTCH1, PTEN, RAD51C, RAD51D, RB1, RET, SDHA, SDHAF2, SDHB, SDHC, SDHD, SMAD4, SMARCA4, SMARCB1, SMARCE1, STK11, SUFU, CVZI140, TP53, TSC1, TSC2, and VHL genes.      No mutations were found in any of the 70 genes analyzed. This test involved sequencing and deletion/duplication analysis of all genes with the exceptions of EPCAM and GREM1 (deletions/duplications only), MITF (only the status of the c.952G>A (p.E318K) alteration is analyzed  "and reported), and SDHA (sequencing only).      A copy of the test report can be found in the Laboratory tab, dated 7/29/2024, and named \"LABORATORY MISCELLANEOUS ORDER\". The report is scanned in as a linked document.    Interpretation:  We discussed several different interpretations of this inconclusive test result. It is not clear if this variant in the NF1 gene is associated with increased cancer risk.  1. This variant may be a benign change that does not increase cancer risk.  2. This variant may be a harmful mutation that causes increased risk for certain cancers/tumors.    NF1 (gain exon 57)  Harmful mutations in the NF1 gene are associated with a diagnosis of neurofibromatosis type 1 and increased risk for breast cancer. Individuals with neurofibromatosis type 1 also have multiple café au lait spots (birthmarks), freckling in the armpits and groin areas, multiple cutaneous neurofibromas (skin tumors), iris Lisch nodules (small masses on the eye), and learning difficulties among others. Isabel denied a personal and family history of the above features. This suggests that the chances that this variant is a harmful mutation in this family are low. If Isabel or her providers become concerned, Isabel can have a physical exam by General Genetics or the NF clinic to look for subtle features of NF1. We discussed that medical management decisions are NOT recommended for individuals with a VUS result.    Genetic testing labs are working to collect evidence about if this variant is harmful or benign, and they will contact me if it is reclassified. One explanation for Isabel's unexplained family history of cancer may be that there is a different gene or combination of genes and environment that are associated with the cancers in this family. It is also important to consider that other relatives with cancer history may have had a mutation in one of the genes tested, and she did not inherit it.  There is also a small " possibility that there is a mutation in one of these genes, and we could not find it with our current testing methods.       Inheritance:  We reviewed the autosomal dominant inheritance of this variant in the NF1 gene. We discussed that Isabel has a 50% chance to pass this variant to each of her children. Likewise, each of her siblings has a 50% risk of having the same variant. Because it is unclear what, if any, risk is associated with this variant, clinical genetic testing for this NF1 variant alone is not recommended for relatives.    Family Studies:  The laboratory may offer additional research based testing for specific relatives in order to help reclassify this variant.    Screening:  Based on this inconclusive test result, it is important for Isabel and her relatives to refer back to the family history for appropriate cancer screening.    Isabel should continue to follow her GI providers recommendations for colonoscopy screening. Given that she had polyps on her last colonoscopy, 3 year follow-up was recommended. She has a colonoscopy scheduled for next month.   We discussed that Isabel likely has some increased risk for lymphoma given her mother's history, but additional screening is typically not recommended at this time.  Isabel is encouraged to discuss this history with her care providers.    Other population cancer screening options, such as those recommended by the American Cancer Society and the National Comprehensive Cancer Network (NCCN), are also appropriate for Isabel and her family. These screening recommendations may change if there are changes to Isabel's personal and/or family history. Final screening recommendations should be made by each individual's managing physician.    Additional Testing Considerations:  Although Isabel's genetic testing result is inconclusive, other relatives may still carry a harmful gene mutation associated with hereditary cancer. Genetic counseling is recommended for  her maternal cousin with uterine cancer history to discuss genetic testing options. If any of these relatives do pursue genetic testing, Isabel is encouraged to contact me so that we may review the impact of their test results on her.    Summary:  We do not have an explanation for Isabel's family history of cancer. While no genetic changes were identified, Isabel may still be at risk for certain cancers due to family history, environmental factors, or other genetic causes not identified by this test.  Because of that, it is important that she continue with cancer screening based on her personal and family history as discussed above.    Genetic testing is rapidly advancing, and new cancer susceptibility genes will most likely be identified in the future. Therefore, I encouraged Isabel to contact me annually or if there are changes in her personal or family history. This may change how we assess her cancer risk, screening, and the testing we would offer.    Plan:  1.  A copy of the test results will be sent to Isabel.    2.  She plans to follow-up with her primary care providers for routine care and cancer screening.  3.  She should contact me periodically, or if her  family history changes, and she would like to know if there are additional screening or testing recommendations at that time.    4. I will attempt to contact Isabel if the laboratory informs me that this VUS has been reclassified.  This may change screening and testing recommendations for Isabel and her relatives.    Nereida Guerrero MS, [facility] [place]  Licensed, Certified Genetic Counselor  [facility]  [email or web address]        Again, thank you for allowing me to participate in the care of your patient.        Sincerely,        Nereida Mccarty, GC

## 2024-09-13 ENCOUNTER — TRANSFERRED RECORDS (OUTPATIENT)
Dept: HEALTH INFORMATION MANAGEMENT | Facility: CLINIC | Age: 44
End: 2024-09-13
Payer: COMMERCIAL

## 2024-09-18 ENCOUNTER — TRANSFERRED RECORDS (OUTPATIENT)
Dept: HEALTH INFORMATION MANAGEMENT | Facility: CLINIC | Age: 44
End: 2024-09-18
Payer: COMMERCIAL

## 2025-01-08 ENCOUNTER — PATIENT OUTREACH (OUTPATIENT)
Dept: CARE COORDINATION | Facility: CLINIC | Age: 45
End: 2025-01-08
Payer: COMMERCIAL

## 2025-01-15 ENCOUNTER — PATIENT OUTREACH (OUTPATIENT)
Dept: CARE COORDINATION | Facility: CLINIC | Age: 45
End: 2025-01-15
Payer: COMMERCIAL

## 2025-01-29 ENCOUNTER — PATIENT OUTREACH (OUTPATIENT)
Dept: CARE COORDINATION | Facility: CLINIC | Age: 45
End: 2025-01-29
Payer: COMMERCIAL

## 2025-03-15 ENCOUNTER — HEALTH MAINTENANCE LETTER (OUTPATIENT)
Age: 45
End: 2025-03-15

## 2025-03-24 ENCOUNTER — MYC REFILL (OUTPATIENT)
Dept: FAMILY MEDICINE | Facility: CLINIC | Age: 45
End: 2025-03-24
Payer: COMMERCIAL

## 2025-03-24 DIAGNOSIS — Z90.721 S/P ABDOMINAL HYSTERECTOMY AND RIGHT SALPINGO-OOPHORECTOMY: ICD-10-CM

## 2025-03-24 DIAGNOSIS — Z90.79 S/P ABDOMINAL HYSTERECTOMY AND RIGHT SALPINGO-OOPHORECTOMY: ICD-10-CM

## 2025-03-24 DIAGNOSIS — Z90.710 S/P ABDOMINAL HYSTERECTOMY AND RIGHT SALPINGO-OOPHORECTOMY: ICD-10-CM

## 2025-03-24 RX ORDER — ESTRADIOL 1 MG/1
1 TABLET ORAL DAILY
Qty: 90 TABLET | Refills: 0 | Status: SHIPPED | OUTPATIENT
Start: 2025-03-24

## 2025-06-22 DIAGNOSIS — G43.909 MIGRAINE WITHOUT STATUS MIGRAINOSUS, NOT INTRACTABLE, UNSPECIFIED MIGRAINE TYPE: ICD-10-CM

## 2025-06-23 RX ORDER — TOPIRAMATE 25 MG/1
25 TABLET, FILM COATED ORAL
Qty: 180 TABLET | Refills: 1 | Status: SHIPPED | OUTPATIENT
Start: 2025-06-23

## 2025-06-25 DIAGNOSIS — Z90.710 S/P ABDOMINAL HYSTERECTOMY AND RIGHT SALPINGO-OOPHORECTOMY: ICD-10-CM

## 2025-06-25 DIAGNOSIS — Z90.79 S/P ABDOMINAL HYSTERECTOMY AND RIGHT SALPINGO-OOPHORECTOMY: ICD-10-CM

## 2025-06-25 DIAGNOSIS — Z90.721 S/P ABDOMINAL HYSTERECTOMY AND RIGHT SALPINGO-OOPHORECTOMY: ICD-10-CM

## 2025-06-26 RX ORDER — ESTRADIOL 1 MG/1
1 TABLET ORAL DAILY
Qty: 90 TABLET | Refills: 0 | OUTPATIENT
Start: 2025-06-26